# Patient Record
Sex: FEMALE | Race: WHITE | NOT HISPANIC OR LATINO | Employment: OTHER | URBAN - METROPOLITAN AREA
[De-identification: names, ages, dates, MRNs, and addresses within clinical notes are randomized per-mention and may not be internally consistent; named-entity substitution may affect disease eponyms.]

---

## 2017-01-12 LAB
EXT ALBUMIN: 3.5
EXT ALKALINE PHOSPHATASE: 84
EXT ALT: 23
EXT AST: 17
EXT BASOPHIL%: 0.6
EXT BILIRUBIN TOTAL: 0.9
EXT BUN: 10
EXT CALCIUM: 9.4
EXT CHLORIDE: 105
EXT CO2: 26
EXT CREATININE: 0.53 MG/DL
EXT EOSINOPHIL%: 5.5
EXT GLUCOSE: 167
EXT HCV QUANT: NOT DETECTED
EXT HEMATOCRIT: 39.9
EXT HEMOGLOBIN: 13.5
EXT LYMPH%: 10.8
EXT MONOCYTES%: 7.9
EXT PLATELETS: 164
EXT POTASSIUM: 3.9
EXT PROTEIN TOTAL: 6
EXT SEGS%: 74.6
EXT SODIUM: 141 MMOL/L
EXT TACROLIMUS LVL: 5
EXT WBC: 6.6

## 2017-01-19 ENCOUNTER — TELEPHONE (OUTPATIENT)
Dept: TRANSPLANT | Facility: CLINIC | Age: 62
End: 2017-01-19

## 2017-02-15 ENCOUNTER — TELEPHONE (OUTPATIENT)
Dept: TRANSPLANT | Facility: CLINIC | Age: 62
End: 2017-02-15

## 2017-02-15 NOTE — TELEPHONE ENCOUNTER
Pt stated that her AV fistula is clotted and needs a referral to see doctor here. Will discuss with Dr. Contreras.

## 2017-02-15 NOTE — TELEPHONE ENCOUNTER
----- Message from Debbie Mcnulty sent at 2/15/2017 11:26 AM CST -----  Contact: Eliezer  Calling to discuss pt appt, please call

## 2017-02-16 ENCOUNTER — TELEPHONE (OUTPATIENT)
Dept: TRANSPLANT | Facility: CLINIC | Age: 62
End: 2017-02-16

## 2017-02-16 NOTE — TELEPHONE ENCOUNTER
Spoke with Dr. Contreras regarding pt's clotted fistula. Dr. Contreras does not feel like pt needs to have fistula declotted since she is not getting dialysis anymore. Called pt to discuss this, and pt stated that her nephrologist was concerned, and wanted her to be seen by a vascular surgeon here at [facility]. Advised pt to call her nephrologist and have them give her a referral to vascular surgery here at [facility]. Pt verbalized understanding.

## 2017-02-17 ENCOUNTER — TELEPHONE (OUTPATIENT)
Dept: TRANSPLANT | Facility: CLINIC | Age: 62
End: 2017-02-17

## 2017-02-17 NOTE — TELEPHONE ENCOUNTER
----- Message from Debbie Mcnulty sent at 2/17/2017 11:40 AM CST -----  Contact: Eliezer  Calling to discuss pt, please call

## 2017-02-20 ENCOUNTER — TELEPHONE (OUTPATIENT)
Dept: VASCULAR SURGERY | Facility: CLINIC | Age: 62
End: 2017-02-20

## 2017-02-20 ENCOUNTER — TELEPHONE (OUTPATIENT)
Dept: TRANSPLANT | Facility: CLINIC | Age: 62
End: 2017-02-20

## 2017-02-20 NOTE — TELEPHONE ENCOUNTER
Spoke to the pt, I was able to get her scheduled for 2/23 with a [facility] to eval old HD acces to be tied off if possible .

## 2017-02-20 NOTE — TELEPHONE ENCOUNTER
Received a call from patient's local MD's office; requesting that patient has dilated AV fistula and wants to have it evaluated at [facility] by vascular surgeon.  Coming to an appt. With liver NP on 2/23/17; will try to schedule appt. Same day.  Appt. Card given to .

## 2017-02-20 NOTE — TELEPHONE ENCOUNTER
----- Message from Carine Bales sent at 2/20/2017  3:43 PM CST -----  Contact: carine/ transplant [id number]  Carine Called and wanted to schedule a appointment with   And also had general questions. Carine said that Dr. Yip wants the patient to come in on 02/23/2017 Please call Елена @ [phone]  . Thanks :)

## 2017-02-23 ENCOUNTER — OFFICE VISIT (OUTPATIENT)
Dept: TRANSPLANT | Facility: CLINIC | Age: 62
End: 2017-02-23
Payer: MEDICARE

## 2017-02-23 ENCOUNTER — INITIAL CONSULT (OUTPATIENT)
Dept: VASCULAR SURGERY | Facility: CLINIC | Age: 62
End: 2017-02-23
Payer: MEDICARE

## 2017-02-23 ENCOUNTER — HOSPITAL ENCOUNTER (OUTPATIENT)
Dept: VASCULAR SURGERY | Facility: CLINIC | Age: 62
Discharge: HOME | End: 2017-02-23
Attending: SURGERY
Payer: MEDICARE

## 2017-02-23 VITALS
HEIGHT: 63 IN | TEMPERATURE: 98 F | BODY MASS INDEX: 28.13 KG/M2 | HEART RATE: 87 BPM | SYSTOLIC BLOOD PRESSURE: 185 MMHG | DIASTOLIC BLOOD PRESSURE: 107 MMHG | OXYGEN SATURATION: 98 % | RESPIRATION RATE: 16 BRPM | WEIGHT: 158.75 LBS

## 2017-02-23 VITALS
BODY MASS INDEX: 28.23 KG/M2 | SYSTOLIC BLOOD PRESSURE: 177 MMHG | TEMPERATURE: 99 F | HEIGHT: 63 IN | DIASTOLIC BLOOD PRESSURE: 87 MMHG | WEIGHT: 159.31 LBS | HEART RATE: 91 BPM

## 2017-02-23 DIAGNOSIS — Z29.89 PROPHYLACTIC IMMUNOTHERAPY: ICD-10-CM

## 2017-02-23 DIAGNOSIS — Z86.19 HISTORY OF HEPATITIS C: ICD-10-CM

## 2017-02-23 DIAGNOSIS — T82.898A ANEURYSM OF ARTERIOVENOUS DIALYSIS FISTULA, INITIAL ENCOUNTER: Primary | ICD-10-CM

## 2017-02-23 DIAGNOSIS — N18.6 ESRD (END STAGE RENAL DISEASE): ICD-10-CM

## 2017-02-23 DIAGNOSIS — Z01.818 PRE-OP EVALUATION: Primary | ICD-10-CM

## 2017-02-23 DIAGNOSIS — Z94.0 KIDNEY TRANSPLANT STATUS, LIVING RELATED DONOR: Chronic | ICD-10-CM

## 2017-02-23 DIAGNOSIS — Z94.4 LIVER REPLACED BY TRANSPLANT: Primary | ICD-10-CM

## 2017-02-23 DIAGNOSIS — N18.6 ESRD (END STAGE RENAL DISEASE): Primary | ICD-10-CM

## 2017-02-23 PROCEDURE — 99999 PR PBB SHADOW E&M-EST. PATIENT-LVL IV: CPT | Mod: PBBFAC,,, | Performed by: SURGERY

## 2017-02-23 PROCEDURE — 99999 PR PBB SHADOW E&M-EST. PATIENT-LVL IV: CPT | Mod: PBBFAC,,, | Performed by: NURSE PRACTITIONER

## 2017-02-23 PROCEDURE — 99214 OFFICE O/P EST MOD 30 MIN: CPT | Mod: PBBFAC | Performed by: NURSE PRACTITIONER

## 2017-02-23 PROCEDURE — 99214 OFFICE O/P EST MOD 30 MIN: CPT | Mod: S$PBB,,, | Performed by: NURSE PRACTITIONER

## 2017-02-23 PROCEDURE — 99204 OFFICE O/P NEW MOD 45 MIN: CPT | Mod: S$PBB,,, | Performed by: SURGERY

## 2017-02-23 PROCEDURE — 93990 DOPPLER FLOW TESTING: CPT | Mod: 26,S$PBB,, | Performed by: SURGERY

## 2017-02-23 RX ORDER — SODIUM CHLORIDE 9 MG/ML
INJECTION, SOLUTION INTRAVENOUS CONTINUOUS
Status: CANCELLED | OUTPATIENT
Start: 2017-02-23

## 2017-02-23 RX ORDER — LIDOCAINE HYDROCHLORIDE 10 MG/ML
1 INJECTION, SOLUTION EPIDURAL; INFILTRATION; INTRACAUDAL; PERINEURAL ONCE
Status: CANCELLED | OUTPATIENT
Start: 2017-02-23 | End: 2017-02-23

## 2017-02-23 RX ORDER — PNV NO.95/FERROUS FUM/FOLIC AC 28MG-0.8MG
100 TABLET ORAL
COMMUNITY

## 2017-02-23 NOTE — PROGRESS NOTES
Елена Atkinson  02/23/2017    HPI:  Patient is a 61 y.o. female with liver and kidney transplant, HTN, DM who is here today for evaluation of HD access.  Ms. Atkinson is a kidney and liver transplant recipient.  She received her living donor kidney transplant in 2009.  Her kidney has been working well. There are no issues. Prior to her kidney transplant, she was on dialysis using a Right Upper arm dialysis access.  She reports that in the last year, the dialysis access site has gotten larger.  It is not painful. She denies arm numbness or tingling. No chest pain. No SOB.     No MI/stroke  Tobacco use: Never smoker    Past Medical History   Diagnosis Date    CKD (chronic kidney disease) 8/26/2013    ESRD (end stage renal disease) 12/18/07     IgA Nephropathy    History of HCV, s/p successful Rx w/ SVR24 - 5/2016      Liver biopsy 2/6/12: Stage 1 fibrosis Completed 24 weeks Harvoni + RBV w/ SVR    Hypertension, renal     IgA nephropathy     Immunosuppressive management encounter following kidney transplant     Kidney transplant status, living related donor 8/19/2009    Obesity     Type 2 diabetes mellitus      Past Surgical History   Procedure Laterality Date    Liver transplant      Kidney transplant      Cardiac surgery      Tubal ligation       Family History   Problem Relation Age of Onset    Cancer Mother 52     lung cancer    Hypertension Mother     Kidney disease Neg Hx      Social History     Social History    Marital status:      Spouse name: N/A    Number of children: 2    Years of education: N/A     Occupational History    Not on file.     Social History Main Topics    Smoking status: Never Smoker    Smokeless tobacco: Never Used    Alcohol use No    Drug use: No    Sexual activity: No     Other Topics Concern    Not on file     Social History Narrative    Елена takes online courses and is a senior working on a degree for [employer].     Current Outpatient  Prescriptions on File Prior to Visit   Medication Sig    hydrALAZINE (APRESOLINE) 25 MG tablet Take 25 mg by mouth 3 (three) times daily.    insulin lispro (HUMALOG KWIKPEN) 100 unit/mL InPn Inject 5 Units as directed Three times a day before meals. As needed    LANTUS SOLOSTAR 100 unit/mL (3 mL) InPn pen INJECT 12 UNITS EACH MORNING. (Patient taking differently: INJECT 16 UNITS EACH MORNING.)    levothyroxine (SYNTHROID) 50 MCG tablet Take 50 mcg by mouth once daily.    magnesium oxide (MAG-OX) 400 mg tablet Take 2 tablets (800 mg total) by mouth 3 (three) times daily. (Patient taking differently: Take 250 mg by mouth once daily. )    multivitamin (MULTIPLE VITAMIN) per tablet Take 1 tablet by mouth Daily.    mycophenolate (CELLCEPT) 250 mg Cap TAKE 3 CAPSULES TWICE DAILY    predniSONE (DELTASONE) 5 MG tablet Take 1 tablet by mouth Daily.    tacrolimus (PROGRAF) 0.5 MG Cap Take 3 capsules (1.5 mg total) by mouth once daily. (Patient taking differently: Take 1 mg by mouth once daily. )    tacrolimus (PROGRAF) 0.5 MG Cap TAKE 3 CAPSULES ONCE A DAY.    vitamin D 1000 units Tab Take 1,000 Units by mouth once daily.      No current facility-administered medications on file prior to visit.        REVIEW OF SYSTEMS:  General: negative; ENT: negative; Allergy and Immunology: negative; Hematological and Lymphatic: Negative; Endocrine: negative; Respiratory: no cough, shortness of breath, or wheezing; Cardiovascular: no chest pain or dyspnea on exertion; Gastrointestinal: no abdominal pain/back, change in bowel habits, or bloody stools; Genito-Urinary: no dysuria, trouble voiding, or hematuria; Musculoskeletal: negative  Neurological: no TIA or stroke symptoms    PHYSICAL EXAM:      Pulse: 91  Temp: 98.5 °F (36.9 °C)      General appearance:  Alert, well-appearing, and in no distress.  Oriented to person, place, and time   Neurological: Normal speech, no focal findings noted; CN II - XII grossly intact            Musculoskeletal: Digits/nail without cyanosis/clubbing.  Normal muscle strength/tone.                 Neck: Supple, no significant adenopathy; thyroid is not enlarged                   No carotid bruit can be auscultated                Chest:  Clear to auscultation, no wheezes, rales or rhonchi, symmetric air entry     No use of accessory muscles             Cardiac: Normal rate and regular rhythm, S1 and S2 normal; PMI non-displaced          Abdomen: Soft, nontender, nondistended, no masses or organomegaly     No rebound tenderness noted; bowel sounds normal     Pulsatile aortic mass is not  palpable.     No groin adenopathy      Extremities:  2+ pedal pulses palpable.     No pedal edema     No ulcerations    LAB RESULTS:  Lab Results   Component Value Date    K 3.9 10/23/2015    K 3.9 10/08/2015    K 3.9 09/25/2015    CREATININE 0.8 10/23/2015    CREATININE 0.9 10/08/2015    CREATININE 0.7 09/25/2015     Lab Results   Component Value Date    WBC 4.2 (A) 10/23/2015    WBC 4.1 (A) 10/08/2015    WBC 4.6 09/25/2015    HCT 33 (A) 10/23/2015    HCT 36 10/08/2015    HCT 35 (A) 09/25/2015     (L) 03/09/2015     (L) 10/15/2012    PLT 89 (L) 09/05/2012     Lab Results   Component Value Date    HGBA1C 6.8 (H) 12/07/2010     IMAGING:    IMP/PLAN:  61 y.o. female with a history of  with liver and kidney transplant, HTN, DM who is here today for evaluation to ligate RUE HD access.   This is likely a R BC AVF - old - patent but aneurysmal    AV access ligation and revision scheduled 3/2/17  HD access duplex today on way out of clinic    Saroj Rockwell MD FACS  Vascular/Endovascular Surgery

## 2017-02-23 NOTE — LETTER
February 23, 2017      Gianni Alonso MD  [address]  [place] [address]           [facility] - Vascular Surgery  [address]  [place] [address]  Phone: [phone]  Fax: [phone]          Patient: Елена Atkinson   MR Number: [id number]   YOB: 1955   Date of Visit: 2/23/2017       Dear Dr. Gianni Alonso:    Thank you for referring Елена Atkinson to me for evaluation. Attached you will find relevant portions of my assessment and plan of care.    If you have questions, please do not hesitate to call me. I look forward to following Елена Atkinson along with you.    Sincerely,    Saroj Rockwell MD    Enclosure  CC:  No Recipients    If you would like to receive this communication electronically, please contact [email or web address] or [phone] to request more information on [employer] access.    For providers and/or their staff who would like to refer a patient to [facility], please contact us through our one-stop-shop provider referral line, [facility], at [phone].    If you feel you have received this communication in error or would no longer like to receive these types of communications, please e-mail [email or web address]

## 2017-02-23 NOTE — LETTER
February 23, 2017        Grayson Hawthorne  [address]  [facility] [place]  [place] [address]  Phone: [phone]  Fax: [phone]     Gianni Alonso  [address]   [place] [address]  Phone: [phone]  Fax: [phone]             [facility]  [address]  [facility] [address]  Phone: [phone]   Patient: Елена Atkinson   MR Number: [id number]   YOB: 1955   Date of Visit: 2/23/2017       Dear Dr. Grayson Hawthorne, Gianni Alonso    Thank you for referring Елена Atkinson to me for evaluation. Attached you will find relevant portions of my assessment and plan of care.    If you have questions, please do not hesitate to call me. I look forward to following Елена Atkinson along with you.    Sincerely,    Ivett Weinstein NP    Enclosure    If you would like to receive this communication electronically, please contact [email or web address] or [phone] to request [employer] Link access.    [employer] Link is a tool which provides read-only access to select patient information with whom you have a relationship. Its easy to use and provides real time access to review your patients record including encounter summaries, notes, results, and demographic information.    If you feel you have received this communication in error or would no longer like to receive these types of communications, please e-mail [email or web address]

## 2017-02-23 NOTE — MR AVS SNAPSHOT
[facility]  [address]  [place] [address]  Phone: [phone]                  Елена Atkinson   2017 3:00 PM   Office Visit    Description:  Female : 1955   Provider:  Ivett Weinstein NP   Department:  [facility] - Liver Transplant                To Do List           Future Appointments        Provider Department Dept Phone    2017 4:30 PM LAB, SAME DAY [facility] [phone]      Your Future Surgeries/Procedures     Mar 02, 2017   Surgery with Saroj Rockwell MD   [facility] ([facility])    [address] [facility] [address] [phone]              Goals (5 Years of Data)     None      [facility]     [facility] On Call Nurse Care Line -  Assistance  Registered nurses in the [facility] On Call Center provide clinical advisement, health education, appointment booking, and other advisory services.  Call for this free service at [phone].             Medications           Message regarding Medications     Verify the changes and/or additions to your medication regime listed below are the same as discussed with your clinician today.  If any of these changes or additions are incorrect, please notify your healthcare provider.             Verify that the below list of medications is an accurate representation of the medications you are currently taking.  If none reported, the list may be blank. If incorrect, please contact your healthcare provider. Carry this list with you in case of emergency.           Current Medications     cyanocobalamin (VITAMIN B-12) 100 MCG tablet Take 100 mcg by mouth once daily.    hydrALAZINE (APRESOLINE) 25 MG tablet Take 25 mg by mouth 3 (three) times daily.    insulin lispro (HUMALOG KWIKPEN) 100 unit/mL InPn Inject 5 Units as directed Three times a day before meals. As needed    LANTUS SOLOSTAR 100 unit/mL (3 mL) InPn pen INJECT 12 UNITS EACH MORNING.  "   levothyroxine (SYNTHROID) 50 MCG tablet Take 50 mcg by mouth once daily.    magnesium oxide (MAG-OX) 400 mg tablet Take 2 tablets (800 mg total) by mouth 3 (three) times daily.    multivitamin (MULTIPLE VITAMIN) per tablet Take 1 tablet by mouth Daily.    mycophenolate (CELLCEPT) 250 mg Cap TAKE 3 CAPSULES TWICE DAILY    predniSONE (DELTASONE) 5 MG tablet Take 1 tablet by mouth Daily.    tacrolimus (PROGRAF) 0.5 MG Cap Take 3 capsules (1.5 mg total) by mouth once daily.    vitamin D 1000 units Tab Take 1,000 Units by mouth once daily.     tacrolimus (PROGRAF) 0.5 MG Cap TAKE 3 CAPSULES ONCE A DAY.           Clinical Reference Information           Your Vitals Were     BP Pulse Temp Resp Height Weight    185/107 (BP Location: Left arm, Patient Position: Sitting, BP Method: Automatic) 87 98 °F (36.7 °C) (Oral) 16 5' 3" (1.6 m) 72 kg (158 lb 11.7 oz)    SpO2 BMI             98% 28.12 kg/m2         Blood Pressure          Most Recent Value    BP  (!)  185/107      Allergies as of 2/23/2017     No Known Drug Allergies      Immunizations Administered on Date of Encounter - 2/23/2017     None      Maintenance Dialysis History     Patient has no recorded history of maintenance dialysis.      Transplant Information        Txp Date Organ Coordinator Care Team    7/26/2003 Liver Ebonie Bell RN Current Hepatologist:  Jeremiah Contreras MD   Current Nephrologist:  Grayson Hawthorne DO   Corresponding Physician:  Gianni Alonso MD   Current PCP:  Gianni Alonso MD   Corresponding Physician:  Grayson Hawthorne DO         Language Assistance Services     ATTENTION: Language assistance services are available, free of charge. Please call [phone].      ATENCIÓN: Si habla español, tiene a loyola disposición servicios gratuitos de asistencia lingüística. Llame al [phone].     CLYDE Ý: N?u b?n nói Ti?ng Vi?t, có các d?ch v? h? tr? ngôn ng? mi?n phí dành cho b?n. G?i s? [phone].         [facility] " Transplant complies with applicable Federal civil rights laws and does not discriminate on the basis of race, color, national origin, age, disability, or sex.

## 2017-02-23 NOTE — PROGRESS NOTES
Transplant Hepatology  Liver Transplant Recipient Follow-up    Transplant Date: 2003  UNOS Native Liver Dx: Diabetes Mellitus - Type II    Елена is here for follow up of her liver transplant.    ORGAN:   Whole or Partial:   Donor Type:   CDC High Risk:   Donor CMV Status:   Donor HCV Status:   Donor HBcAb:   Biliary Anastomosis:   Arterial Anatomy:   IVC reconstruction:   Portal vein status:     She has had the following complications since transplant: recurrent HCV s/p treatment w/ cure. The noted complications is well controlled.    Subjective:     Interval History: Елена was last seen on . Currently, she is doing well. Current complaints include none.    She received a  donor liver transplant for HCV cirrhosis at [facility] in 2003.  She subsequently developed ESRD from IgA nephropathy which required initiation of dialysis in 2007 and she ended up having a living kidney transplant on 09.     Her last liver biopsy was in 2012 but she had an elastography test in 2015.  2015 elastography showed F2-3    Received Harvoni/riba x 24 weeks- completed on 10/2015 w/ SVR 24  She is maintained on tacro + MMF, with good allograft function  No c/o jaundice, dark urine, abdominal pain or distension, edema    Her BP is elevated today, she did not take her hydralazine    Last liver biopsy: 2012  NO EVIDENCE OF REJECTION.  CHRONIC HEPATITIS C WITH MILD ACTIVITY  AND RARE APOPTOTIC BODIES (GRADE 1-2 OUT OF 4).  PORTAL FIBROSIS (STAGE 1 OUT OF 4)  NO EVIDENCE OF CIRRHOSIS.  STEATOSIS, LESS THAN 1%.  NO IRON.  IRON AND TRICHROME WITH APPROPRIATE CONTROLS  Review of Systems   Constitutional: Negative for activity change, appetite change, chills, diaphoresis, fatigue, fever and unexpected weight change.   HENT: Negative for facial swelling and nosebleeds.    Respiratory: Negative for cough, chest tightness and shortness of breath.    Cardiovascular: Negative for chest pain,  palpitations and leg swelling.   Gastrointestinal: Negative for abdominal distention, abdominal pain, blood in stool, constipation, diarrhea, nausea and vomiting.   Musculoskeletal: Negative for neck pain and neck stiffness.   Skin: Negative for color change, pallor and rash.   Neurological: Negative for dizziness, tremors, syncope, weakness, light-headedness and headaches.   Hematological: Negative for adenopathy. Does not bruise/bleed easily.   Psychiatric/Behavioral: Negative for agitation, behavioral problems, confusion and decreased concentration.       Objective:     Physical Exam   Constitutional: She is oriented to person, place, and time. She appears well-developed and well-nourished. No distress.   HENT:   Head: Normocephalic and atraumatic.   Eyes: Conjunctivae are normal. Pupils are equal, round, and reactive to light. No scleral icterus.   Neck: Normal range of motion. Neck supple.   Cardiovascular: Normal rate.    Pulmonary/Chest: Effort normal.   Abdominal: Soft. She exhibits no distension and no mass. There is no tenderness. There is no rebound and no guarding.   Musculoskeletal: Normal range of motion.   Neurological: She is alert and oriented to person, place, and time. No cranial nerve deficit. She exhibits normal muscle tone. Coordination normal.   No asterixis   Skin: Skin is warm and dry. No rash noted. She is not diaphoretic. No erythema. No pallor.        Surgical incision healed well, no hernia appreciated   Psychiatric: She has a normal mood and affect. Her behavior is normal. Judgment and thought content normal.     Lab Results   Component Value Date    BILITOT 0.7 03/09/2015    AST 23 10/23/2015     (H) 03/09/2015    ALT 26 10/23/2015     (H) 03/09/2015    ALKPHOS 127 03/09/2015    CREATININE 0.8 10/23/2015    CREATININE 0.7 03/09/2015    ALBUMIN 3.0 (A) 10/23/2015    ALBUMIN 1.5 (L) 03/09/2015     Lab Results   Component Value Date    WBC 4.2 (A) 10/23/2015    WBC 4.92  03/09/2015    HGB 10.5 (A) 10/23/2015    HCT 33 (A) 10/23/2015     (L) 03/09/2015     Lab Results   Component Value Date    TACROLIMUS 7.4 10/08/2015    TACROLIMUS 3.3 (L) 03/09/2015       Assessment/Plan:     1. Liver replaced by transplant for HCV 7/2003    2. Prophylactic immunosuppression for kidney transplant    3. Kidney transplant status, living related donor 8/19/2009    4. History of HCV, s/p successful Rx w/ SVR24 - 5/2016        S/P liver transplant due to HCV : Normal LFTs. Continue monitoring symptoms, labs and drug levels for drug-related toxicity and side effects  -continue with post transplant labs per protocol  IS: Chronic immunosuppressive medications for rejection prophylaxis at target.   no adjustment needed.   Maintenance:  -Instructed to f/u with PCP for regular health maintenance care including cancer screenings and BMD  -Reviewed need to avoid sun exposure with use of sunblock, hats, long sleeves related to increased risk of skin cancers  -Recommend f/u with Dermatology for annual skin checks    RTC 1 year with staff Hepatology    DIANA Biggs Patient Status  Functional Status: 100% - Normal, no complaints, no evidence of disease  Physical Capacity: No Limitations

## 2017-03-01 ENCOUNTER — TELEPHONE (OUTPATIENT)
Dept: VASCULAR SURGERY | Facility: CLINIC | Age: 62
End: 2017-03-01

## 2017-03-01 RX ORDER — ALENDRONATE SODIUM 70 MG/1
70 TABLET ORAL
COMMUNITY
End: 2022-04-04

## 2017-03-01 RX ORDER — TALC
250 POWDER (GRAM) TOPICAL
COMMUNITY
End: 2023-01-03

## 2017-03-01 RX ORDER — ALLOPURINOL 300 MG/1
300 TABLET ORAL EVERY MORNING
COMMUNITY
End: 2023-01-03 | Stop reason: SDUPTHER

## 2017-03-01 RX ORDER — LEVOTHYROXINE SODIUM 75 UG/1
75 TABLET ORAL
COMMUNITY
End: 2023-01-03 | Stop reason: SDUPTHER

## 2017-03-02 ENCOUNTER — SURGERY (OUTPATIENT)
Age: 62
End: 2017-03-02

## 2017-03-02 ENCOUNTER — ANESTHESIA (OUTPATIENT)
Dept: SURGERY | Facility: HOSPITAL | Age: 62
End: 2017-03-02
Payer: MEDICARE

## 2017-03-02 ENCOUNTER — ANESTHESIA EVENT (OUTPATIENT)
Dept: SURGERY | Facility: HOSPITAL | Age: 62
End: 2017-03-02
Payer: MEDICARE

## 2017-03-02 ENCOUNTER — HOSPITAL ENCOUNTER (OUTPATIENT)
Facility: HOSPITAL | Age: 62
Discharge: HOME | End: 2017-03-02
Attending: SURGERY | Admitting: SURGERY
Payer: MEDICARE

## 2017-03-02 VITALS
WEIGHT: 147 LBS | DIASTOLIC BLOOD PRESSURE: 70 MMHG | OXYGEN SATURATION: 97 % | HEART RATE: 82 BPM | TEMPERATURE: 98 F | SYSTOLIC BLOOD PRESSURE: 150 MMHG | HEIGHT: 63 IN | RESPIRATION RATE: 16 BRPM | BODY MASS INDEX: 26.05 KG/M2

## 2017-03-02 DIAGNOSIS — T82.898A ANEURYSM OF ARTERIOVENOUS DIALYSIS FISTULA, INITIAL ENCOUNTER: Primary | ICD-10-CM

## 2017-03-02 LAB
ANION GAP SERPL CALC-SCNC: 9 MMOL/L
BASOPHILS # BLD AUTO: 0.02 K/UL
BASOPHILS NFR BLD: 0.3 %
BUN SERPL-MCNC: 16 MG/DL
CALCIUM SERPL-MCNC: 9.1 MG/DL
CHLORIDE SERPL-SCNC: 107 MMOL/L
CO2 SERPL-SCNC: 24 MMOL/L
CREAT SERPL-MCNC: 0.7 MG/DL
DIFFERENTIAL METHOD: ABNORMAL
EOSINOPHIL # BLD AUTO: 0.3 K/UL
EOSINOPHIL NFR BLD: 4.7 %
ERYTHROCYTE [DISTWIDTH] IN BLOOD BY AUTOMATED COUNT: 12.9 %
EST. GFR  (AFRICAN AMERICAN): >60 ML/MIN/1.73 M^2
EST. GFR  (NON AFRICAN AMERICAN): >60 ML/MIN/1.73 M^2
GLUCOSE SERPL-MCNC: 181 MG/DL
HCT VFR BLD AUTO: 40.1 %
HGB BLD-MCNC: 13.6 G/DL
LYMPHOCYTES # BLD AUTO: 0.9 K/UL
LYMPHOCYTES NFR BLD: 15.2 %
MCH RBC QN AUTO: 32.3 PG
MCHC RBC AUTO-ENTMCNC: 33.9 %
MCV RBC AUTO: 95 FL
MONOCYTES # BLD AUTO: 0.4 K/UL
MONOCYTES NFR BLD: 6.3 %
NEUTROPHILS # BLD AUTO: 4.5 K/UL
NEUTROPHILS NFR BLD: 73 %
PLATELET # BLD AUTO: 147 K/UL
PMV BLD AUTO: 9.8 FL
POCT GLUCOSE: 164 MG/DL (ref 70–110)
POCT GLUCOSE: 203 MG/DL (ref 70–110)
POTASSIUM SERPL-SCNC: 3.7 MMOL/L
RBC # BLD AUTO: 4.21 M/UL
SODIUM SERPL-SCNC: 140 MMOL/L
WBC # BLD AUTO: 6.19 K/UL

## 2017-03-02 PROCEDURE — 25000003 PHARM REV CODE 250: Performed by: NURSE PRACTITIONER

## 2017-03-02 PROCEDURE — 63600175 PHARM REV CODE 636 W HCPCS: Performed by: NURSE ANESTHETIST, CERTIFIED REGISTERED

## 2017-03-02 PROCEDURE — 71000045 HC DOSC ROUTINE RECOVERY EA ADD'L HR: Performed by: SURGERY

## 2017-03-02 PROCEDURE — 88304 TISSUE EXAM BY PATHOLOGIST: CPT | Mod: 26,,, | Performed by: PATHOLOGY

## 2017-03-02 PROCEDURE — D9220A PRA ANESTHESIA: Mod: CRNA,,, | Performed by: NURSE ANESTHETIST, CERTIFIED REGISTERED

## 2017-03-02 PROCEDURE — 25000003 PHARM REV CODE 250: Performed by: SURGERY

## 2017-03-02 PROCEDURE — 80048 BASIC METABOLIC PNL TOTAL CA: CPT

## 2017-03-02 PROCEDURE — 25000003 PHARM REV CODE 250: Performed by: NURSE ANESTHETIST, CERTIFIED REGISTERED

## 2017-03-02 PROCEDURE — 88304 TISSUE EXAM BY PATHOLOGIST: CPT | Performed by: PATHOLOGY

## 2017-03-02 PROCEDURE — 85025 COMPLETE CBC W/AUTO DIFF WBC: CPT

## 2017-03-02 PROCEDURE — 27201423 OPTIME MED/SURG SUP & DEVICES STERILE SUPPLY: Performed by: SURGERY

## 2017-03-02 PROCEDURE — 71000044 HC DOSC ROUTINE RECOVERY FIRST HOUR: Performed by: SURGERY

## 2017-03-02 PROCEDURE — 36832 AV FISTULA REVISION OPEN: CPT | Mod: GC,,, | Performed by: SURGERY

## 2017-03-02 PROCEDURE — 27200671 HC STIMUCATH NEEDLE/ CATHETER: Performed by: ANESTHESIOLOGY

## 2017-03-02 PROCEDURE — 37000008 HC ANESTHESIA 1ST 15 MINUTES: Performed by: SURGERY

## 2017-03-02 PROCEDURE — 82962 GLUCOSE BLOOD TEST: CPT | Performed by: SURGERY

## 2017-03-02 PROCEDURE — 36000706: Performed by: SURGERY

## 2017-03-02 PROCEDURE — 71000015 HC POSTOP RECOV 1ST HR: Performed by: SURGERY

## 2017-03-02 PROCEDURE — 36000707: Performed by: SURGERY

## 2017-03-02 PROCEDURE — 37000009 HC ANESTHESIA EA ADD 15 MINS: Performed by: SURGERY

## 2017-03-02 PROCEDURE — D9220A PRA ANESTHESIA: Mod: ANES,,, | Performed by: ANESTHESIOLOGY

## 2017-03-02 RX ORDER — SODIUM CHLORIDE 9 MG/ML
INJECTION, SOLUTION INTRAVENOUS CONTINUOUS
Status: DISCONTINUED | OUTPATIENT
Start: 2017-03-02 | End: 2017-03-02 | Stop reason: HOSPADM

## 2017-03-02 RX ORDER — FENTANYL CITRATE 50 UG/ML
INJECTION, SOLUTION INTRAMUSCULAR; INTRAVENOUS
Status: DISCONTINUED | OUTPATIENT
Start: 2017-03-02 | End: 2017-03-02

## 2017-03-02 RX ORDER — GLUCAGON 1 MG
1 KIT INJECTION
Status: DISCONTINUED | OUTPATIENT
Start: 2017-03-02 | End: 2017-03-02 | Stop reason: HOSPADM

## 2017-03-02 RX ORDER — IBUPROFEN 200 MG
24 TABLET ORAL
Status: DISCONTINUED | OUTPATIENT
Start: 2017-03-02 | End: 2017-03-02 | Stop reason: HOSPADM

## 2017-03-02 RX ORDER — ONDANSETRON 2 MG/ML
INJECTION INTRAMUSCULAR; INTRAVENOUS
Status: DISCONTINUED | OUTPATIENT
Start: 2017-03-02 | End: 2017-03-02

## 2017-03-02 RX ORDER — MIDAZOLAM HYDROCHLORIDE 1 MG/ML
INJECTION, SOLUTION INTRAMUSCULAR; INTRAVENOUS
Status: DISCONTINUED | OUTPATIENT
Start: 2017-03-02 | End: 2017-03-02

## 2017-03-02 RX ORDER — LIDOCAINE HYDROCHLORIDE 10 MG/ML
1 INJECTION, SOLUTION EPIDURAL; INFILTRATION; INTRACAUDAL; PERINEURAL ONCE
Status: COMPLETED | OUTPATIENT
Start: 2017-03-02 | End: 2017-03-02

## 2017-03-02 RX ORDER — BACITRACIN 50000 [IU]/1
INJECTION, POWDER, FOR SOLUTION INTRAMUSCULAR
Status: DISCONTINUED | OUTPATIENT
Start: 2017-03-02 | End: 2017-03-02 | Stop reason: HOSPADM

## 2017-03-02 RX ORDER — HYDROCODONE BITARTRATE AND ACETAMINOPHEN 5; 325 MG/1; MG/1
1 TABLET ORAL EVERY 4 HOURS PRN
Qty: 31 TABLET | Refills: 0 | Status: SHIPPED | OUTPATIENT
Start: 2017-03-02 | End: 2022-01-03

## 2017-03-02 RX ORDER — ONDANSETRON HYDROCHLORIDE 4 MG/5ML
4 SOLUTION ORAL EVERY 6 HOURS PRN
Status: DISCONTINUED | OUTPATIENT
Start: 2017-03-02 | End: 2017-03-02 | Stop reason: HOSPADM

## 2017-03-02 RX ORDER — SODIUM CHLORIDE 9 MG/ML
INJECTION, SOLUTION INTRAVENOUS CONTINUOUS PRN
Status: DISCONTINUED | OUTPATIENT
Start: 2017-03-02 | End: 2017-03-02

## 2017-03-02 RX ORDER — HYDROCODONE BITARTRATE AND ACETAMINOPHEN 5; 325 MG/1; MG/1
1 TABLET ORAL EVERY 4 HOURS PRN
Status: DISCONTINUED | OUTPATIENT
Start: 2017-03-02 | End: 2017-03-02 | Stop reason: HOSPADM

## 2017-03-02 RX ORDER — PHENYLEPHRINE HCL IN 0.9% NACL 1 MG/10 ML
SYRINGE (ML) INTRAVENOUS
Status: DISCONTINUED | OUTPATIENT
Start: 2017-03-02 | End: 2017-03-02

## 2017-03-02 RX ORDER — IBUPROFEN 200 MG
16 TABLET ORAL
Status: DISCONTINUED | OUTPATIENT
Start: 2017-03-02 | End: 2017-03-02 | Stop reason: HOSPADM

## 2017-03-02 RX ORDER — PROPOFOL 10 MG/ML
VIAL (ML) INTRAVENOUS CONTINUOUS PRN
Status: DISCONTINUED | OUTPATIENT
Start: 2017-03-02 | End: 2017-03-02

## 2017-03-02 RX ORDER — HEPARIN SODIUM 1000 [USP'U]/ML
INJECTION, SOLUTION INTRAVENOUS; SUBCUTANEOUS
Status: DISCONTINUED | OUTPATIENT
Start: 2017-03-02 | End: 2017-03-02

## 2017-03-02 RX ORDER — HEPARIN SODIUM 1000 [USP'U]/ML
INJECTION, SOLUTION INTRAVENOUS; SUBCUTANEOUS
Status: DISCONTINUED | OUTPATIENT
Start: 2017-03-02 | End: 2017-03-02 | Stop reason: HOSPADM

## 2017-03-02 RX ORDER — PROTAMINE SULFATE 10 MG/ML
INJECTION, SOLUTION INTRAVENOUS
Status: DISCONTINUED | OUTPATIENT
Start: 2017-03-02 | End: 2017-03-02

## 2017-03-02 RX ORDER — INSULIN ASPART 100 [IU]/ML
0-5 INJECTION, SOLUTION INTRAVENOUS; SUBCUTANEOUS
Status: DISCONTINUED | OUTPATIENT
Start: 2017-03-02 | End: 2017-03-02 | Stop reason: HOSPADM

## 2017-03-02 RX ADMIN — FENTANYL CITRATE 25 MCG: 50 INJECTION, SOLUTION INTRAMUSCULAR; INTRAVENOUS at 11:03

## 2017-03-02 RX ADMIN — HEPARIN SODIUM 10000 UNITS: 1000 INJECTION, SOLUTION INTRAVENOUS; SUBCUTANEOUS at 10:03

## 2017-03-02 RX ADMIN — Medication 50 MCG: at 09:03

## 2017-03-02 RX ADMIN — PROTAMINE SULFATE 5 MG: 10 INJECTION, SOLUTION INTRAVENOUS at 11:03

## 2017-03-02 RX ADMIN — HEPARIN SODIUM 5000 UNITS: 1000 INJECTION, SOLUTION INTRAVENOUS; SUBCUTANEOUS at 10:03

## 2017-03-02 RX ADMIN — MIDAZOLAM HYDROCHLORIDE 2 MG: 1 INJECTION, SOLUTION INTRAMUSCULAR; INTRAVENOUS at 09:03

## 2017-03-02 RX ADMIN — LIDOCAINE HYDROCHLORIDE 0.2 MG: 10 INJECTION, SOLUTION EPIDURAL; INFILTRATION; INTRACAUDAL; PERINEURAL at 08:03

## 2017-03-02 RX ADMIN — Medication 50 MCG: at 10:03

## 2017-03-02 RX ADMIN — PROTAMINE SULFATE 25 MG: 10 INJECTION, SOLUTION INTRAVENOUS at 11:03

## 2017-03-02 RX ADMIN — PROPOFOL 150 MCG/KG/MIN: 10 INJECTION, EMULSION INTRAVENOUS at 09:03

## 2017-03-02 RX ADMIN — Medication 4 MG: at 12:03

## 2017-03-02 RX ADMIN — SODIUM CHLORIDE: 0.9 INJECTION, SOLUTION INTRAVENOUS at 08:03

## 2017-03-02 RX ADMIN — BACITRACIN 50000 UNITS: 50000 INJECTION, POWDER, LYOPHILIZED, FOR SOLUTION INTRAMUSCULAR at 11:03

## 2017-03-02 RX ADMIN — SODIUM CHLORIDE: 0.9 INJECTION, SOLUTION INTRAVENOUS at 09:03

## 2017-03-02 RX ADMIN — FENTANYL CITRATE 50 MCG: 50 INJECTION, SOLUTION INTRAMUSCULAR; INTRAVENOUS at 09:03

## 2017-03-02 RX ADMIN — ONDANSETRON 4 MG: 2 INJECTION INTRAMUSCULAR; INTRAVENOUS at 11:03

## 2017-03-02 RX ADMIN — Medication 2 G: at 09:03

## 2017-03-02 RX ADMIN — Medication 100 MCG: at 10:03

## 2017-03-02 RX ADMIN — Medication 100 MCG: at 09:03

## 2017-03-02 NOTE — PROGRESS NOTES
Notified Dr. Solomon of blood glucose 203. No new order given, He stated will cover after procedure.

## 2017-03-02 NOTE — ANESTHESIA POSTPROCEDURE EVALUATION
"Anesthesia Post Evaluation    Patient: Елена Atkinson    Procedure(s) Performed: Procedure(s) (LRB):  LIGATION/ EXCISION -FISTULA-AV  right brachial artery repair (Right)    Final Anesthesia Type: regional  Patient location during evaluation: PACU  Patient participation: Yes- Able to Participate  Level of consciousness: awake and alert  Post-procedure vital signs: reviewed and stable  Pain management: adequate  Airway patency: patent  PONV status at discharge: No PONV  Anesthetic complications: no      Cardiovascular status: blood pressure returned to baseline  Respiratory status: unassisted  Hydration status: euvolemic  Follow-up not needed.        Visit Vitals    BP (!) 150/70    Pulse 80    Temp 36.7 °C (98.1 °F) (Oral)    Resp 16    Ht 5' 3" (1.6 m)    Wt 66.7 kg (147 lb)    SpO2 96%    Breastfeeding No    BMI 26.04 kg/m2       Pain/Sari Score: Pain Assessment Performed: Yes (3/2/2017 11:57 AM)  Presence of Pain: denies (3/2/2017 11:57 AM)  Sari Score: 9 (3/2/2017 11:57 AM)      "

## 2017-03-02 NOTE — IP AVS SNAPSHOT
[facility]  [address]  [facility] [address]  Phone: [phone]           Patient Discharge Instructions     Our goal is to set you up for success. This packet includes information on your condition, medications, and your home care. It will help you to care for yourself so you don't get sicker and need to go back to the hospital.     Please ask your nurse if you have any questions.        There are many details to remember when preparing to leave the hospital. Here is what you will need to do:    1. Take your medicine. If you are prescribed medications, review your Medication List in the following pages. You may have new medications to  at the pharmacy and others that you'll need to stop taking. Review the instructions for how and when to take your medications. Talk with your doctor or nurses if you are unsure of what to do.     2. Go to your follow-up appointments. Specific follow-up information is listed in the following pages. Your may be contacted by a transition nurse or clinical provider about future appointments. Be sure we have all of the phone numbers to reach you, if needed. Please contact your provider's office if you are unable to make an appointment.     3. Watch for warning signs. Your doctor or nurse will give you detailed warning signs to watch for and when to call for assistance. These instructions may also include educational information about your condition. If you experience any of warning signs to your health, call your doctor.               [facility]  Unless otherwise directed by your provider, please contact [facility], our nurse care line that is available for 24/7 assistance.     [phone] (toll-free)    Registered nurses in the [facility] provide clinical advisement, health education, appointment booking, and other advisory services.                    ** Verify the list of medication(s) below is accurate and up  to date. Carry this with you in case of emergency. If your medications have changed, please notify your healthcare provider.             Medication List      START taking these medications        Additional Info                      docusate sodium 50 MG capsule   Commonly known as:  COLACE   Refills:  0   Dose:  50 mg    Instructions:  Take 1 capsule (50 mg total) by mouth 2 (two) times daily.     Begin Date    AM    Noon    PM    Bedtime       hydrocodone-acetaminophen 5-325mg 5-325 mg per tablet   Commonly known as:  NORCO   Quantity:  31 tablet   Refills:  0   Dose:  1 tablet    Instructions:  Take 1 tablet by mouth every 4 (four) hours as needed.     Begin Date    AM    Noon    PM    Bedtime         CHANGE how you take these medications        Additional Info                      LANTUS SOLOSTAR 100 unit/mL (3 mL) Inpn pen   Quantity:  15 mL   Refills:  11   What changed:  See the new instructions.   Generic drug:  insulin glargine    Instructions:  INJECT 12 UNITS EACH MORNING.     Begin Date    AM    Noon    PM    Bedtime       * tacrolimus 0.5 MG Cap   Commonly known as:  PROGRAF   Refills:  0   Dose:  1.5 mg   What changed:    - how much to take  - when to take this    Instructions:  Take 3 capsules (1.5 mg total) by mouth once daily.     Begin Date    AM    Noon    PM    Bedtime       * tacrolimus 0.5 MG Cap   Commonly known as:  PROGRAF   Quantity:  90 capsule   Refills:  11   What changed:  Another medication with the same name was changed. Make sure you understand how and when to take each.    Instructions:  TAKE 3 CAPSULES ONCE A DAY.     Begin Date    AM    Noon    PM    Bedtime       * Notice:  This list has 2 medication(s) that are the same as other medications prescribed for you. Read the directions carefully, and ask your doctor or other care provider to review them with you.      CONTINUE taking these medications        Additional Info                      alendronate 70 MG tablet   Commonly  known as:  FOSAMAX   Refills:  0   Dose:  70 mg    Instructions:  Take 70 mg by mouth every 7 days. Takes on Saturdays     Begin Date    AM    Noon    PM    Bedtime       allopurinol 300 MG tablet   Commonly known as:  ZYLOPRIM   Refills:  0   Dose:  300 mg    Instructions:  Take 300 mg by mouth every morning.     Begin Date    AM    Noon    PM    Bedtime       cyanocobalamin 100 MCG tablet   Commonly known as:  VITAMIN B-12   Refills:  0   Dose:  100 mcg    Instructions:  Take 100 mcg by mouth after lunch.     Begin Date    AM    Noon    PM    Bedtime       HUMALOG KWIKPEN 100 unit/mL Inpn pen   Refills:  0   Dose:  5 Units   Indications:  type 2 diabetes mellitus   Generic drug:  insulin lispro    Instructions:  Inject 5 Units as directed Three times a day before meals. And has a Sliding Scale     Begin Date    AM    Noon    PM    Bedtime       hydrALAZINE 25 MG tablet   Commonly known as:  APRESOLINE   Refills:  0   Dose:  25 mg    Instructions:  Take 25 mg by mouth 3 (three) times daily. Has been taking twice a day     Begin Date    AM    Noon    PM    Bedtime       levothyroxine 75 MCG tablet   Commonly known as:  SYNTHROID   Refills:  0   Dose:  75 mcg    Instructions:  Take 75 mcg by mouth before breakfast.     Begin Date    AM    Noon    PM    Bedtime       magnesium oxide 250 mg Tab   Commonly known as:  MAG-OX   Refills:  0   Dose:  250 mg    Instructions:  Take 250 mg by mouth after lunch.     Begin Date    AM    Noon    PM    Bedtime       MULTIPLE VITAMIN per tablet   Refills:  0   Dose:  1 tablet   Generic drug:  multivitamin    Instructions:  Take 1 tablet by mouth after lunch.     Begin Date    AM    Noon    PM    Bedtime       mycophenolate 250 mg Cap   Commonly known as:  CELLCEPT   Quantity:  180 capsule   Refills:  11    Instructions:  TAKE 3 CAPSULES TWICE DAILY     Begin Date    AM    Noon    PM    Bedtime       PREDNISONE ORAL   Refills:  0   Dose:  4 mg    Instructions:  Take 4 mg by mouth  every morning.     Begin Date    AM    Noon    PM    Bedtime       vitamin D 1000 units Tab   Refills:  0   Dose:  1000 Units    Instructions:  Take 1,000 Units by mouth after lunch.     Begin Date    AM    Noon    PM    Bedtime            Where to Get Your Medications      You can get these medications from any pharmacy     Bring a paper prescription for each of these medications     hydrocodone-acetaminophen 5-325mg 5-325 mg per tablet       You don't need a prescription for these medications     docusate sodium 50 MG capsule                  Please bring to all follow up appointments:    1. A copy of your discharge instructions.  2. All medicines you are currently taking in their original bottles.  3. Identification and insurance card.    Please arrive 15 minutes ahead of scheduled appointment time.    Please call 24 hours in advance if you must reschedule your appointment and/or time.        Follow-up Information     Follow up with Saroj Rockwell MD In 2 weeks.    Specialty:  Vascular Surgery    Contact information:    [address]  [facility] [address]  [phone]          Discharge Instructions     Future Orders    Call MD for:  difficulty breathing or increased cough     Call MD for:  increased confusion or weakness     Call MD for:  persistent dizziness, light-headedness, or visual disturbances     Call MD for:  redness, tenderness, or signs of infection (pain, swelling, redness, odor or green/yellow discharge around incision site)     Call MD for:  severe uncontrolled pain     Call MD for:  temperature >100.4     Call MD for:  worsening rash     Diet general     Questions:    Total calories:      Fat restriction, if any:      Protein restriction, if any:      Na restriction, if any:      Fluid restriction:      Additional restrictions:      Other restrictions (specify):     Comments:    No driving while still taking pain medications daily.   Take a stool softener while taking pain medications  daily.   No lifting more than 10 lbs for 6 weeks with right arm.        Discharge Instructions       Remove dressing in 48 hours, ok to shower and wash site, no tub bathing or submerging in water x 2 weeks.       Primary Diagnosis     Your primary diagnosis was:  Aneurysm      Admission Information     Date & Time Provider Department CSN    3/2/2017  7:02 AM Saroj Rockwell MD [facility] [id number]      Care Providers     Provider Role Specialty Primary office phone    Saroj Rockwell MD Attending Provider Vascular Surgery [phone]    Saroj Rockwell MD Surgeon  Vascular Surgery [phone]      Your Vitals Were     BP                   136/63 (BP Location: Left arm)           Recent Lab Values        12/7/2010                          11:18 AM           A1C 6.8 (H)                       Pending Labs     Order Current Status    Specimen to Pathology - Surgery Collected (03/02/17 1009)      Allergies as of 3/2/2017        Reactions    No Known Drug Allergies       Advance Directives     An advance directive is a document which, in the event you are no longer able to make decisions for yourself, tells your healthcare team what kind of treatment you do or do not want to receive, or who you would like to make those decisions for you.  If you do not currently have an advance directive, [facility] encourages you to create one.  For more information call:  [phone]), [phone] ([phone]),  or log on to [email or web address].        Language Assistance Services     ATTENTION: Language assistance services are available, free of charge. Please call [phone].      ATENCIÓN: Si habla español, tiene a loyola disposición servicios gratuitos de asistencia lingüística. Llame al [phone].     CHÚ Ý: N?u b?n nói Ti?ng Vi?t, có các d?ch v? h? tr? ngôn ng? mi?n phí dành cho b?n. G?i s? [phone].        Chronic Kindey Disease Education             Diabetes Discharge  Instructions [facility] complies with applicable Federal civil rights laws and does not discriminate on the basis of race, color, national origin, age, disability, or sex.

## 2017-03-02 NOTE — PRE-PROCEDURE INSTRUCTIONS
Spoke with Patient.  NPO, medication, and pre-op instructions reviewed.  Denies previous problems with Anesthesia.  Stated that her morning glucose readings are usually higher in the morning ~ 176.  Stated that she needs an ekg in DOSC.  Sent an IM message to  Tammy (Dr. Rockwell) about her saying that she needs an ekg pre-op. Verbalized understanding of instructions.

## 2017-03-02 NOTE — OP NOTE
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]; R BC AVF aneurysm - painful    POSTOPERATIVE DIAGNOSIS: Aneurysm of arteriovenous dialysis fistula, initial encounter [T82.898A]; R BC AVF aneurysm - painful    PROCEDURES PERFORMED:  1) Excision of right brachiocephalic AVF aneurysm  2) Repair of right brachial artery with basilic vein patch angioplasty    ATTENDING SURGEON: Dr Saroj Rockwell    RESIDENT: Dr Ana Talavera    ANESTHESIA: Regional/MAC    KEY FINDINGS: Successful excision and vein patch angioplasty  2+ R radial     ESTIMATED BLOOD LOSS: 25 ml    DRAINS: None    COMPLICATIONS: None    INDICATIONS FOR PROCEDURE: The patient is a 61 y.o. female with a history of a liver and kidney transplant and a remote history of a right brachiocephalic AV fistula. The fistula had developed an aneurysm and was painful. Based on this surgery was indicated.    PROCEDURE IN DETAIL: After informed consent was obtained, the patient was brought to the Operative Theater and placed in in the supine position. After adequate regional anesthesia the patient was prepped and draped in the usual sterile fashion. We did look with an ultrasound and identified the fistula and it's anastomosis. The venous outflow was thrombosed. We identified an adequate appearing basilic vein. A timeout procedure was performed and a transverse incision was made over the fistula proximal to the antecubital fossa was made, extending in an L shape medially. The incision was carried down through the subcutaneous tissue and the aneurysm was encountered. We began to dissect the aneurysm free. We did enter the aneurysm. We placed a clamped and ligated the distal aneurysm with a 3-0 Prolene and tied it off with a 0 Silk tie. We began to dissect around the brachial artery and obtained proximal and distal control. We did ligate the brachial vein that was running with the brachial  artery and had hoped to use this vein for our patch, but it was very thin walled and not suitable for a patch. Systemic heparin was administered by anesthesia. With control of the brachial artery, we noted that the base of the aneurysm was quite large and we were concerned that a primary repair would narrow the brachial artery. We identified the basilic vein and ligated and removed an adequate section which we trimmed with Leonardo scissors to use as an autogenous vein patch. We placed profunda clamps proximally and distally on the brachial artery and sharply removed the aneurysm from the brachial artery. We flushed the brachial artery. We then proceeded to place the basilic vein patch over the hole in the brachial artery with 6-0 Prolenes. We backbled the brachial artery prior to closing the entire patch. Two repair stitches were needed. After a test dose, protamine was given. Excellent hemostasis was achieved. The wound was closed in layers with interrupted 2-0 and 3-0 Vicryls and a running 4-0 Monocryl. A sterile dressing was applied. The patient tolerated the procedure well and was transported to the recovery room in stable condition.

## 2017-03-02 NOTE — INTERVAL H&P NOTE
The patient has been examined and the H&P has been reviewed:    I concur with the findings and no changes have occurred since H&P was written.    Anesthesia/Surgery risks, benefits and alternative options discussed and understood by patient/family.    Review of patient's allergies indicates:   Allergen Reactions    No known drug allergies            There are no hospital problems to display for this patient.

## 2017-03-02 NOTE — ANESTHESIA RELEASE NOTE
Anesthesia Release from PACU Note    Patient: Елена Atkinson  Anesthesia Release from PACU Note    Patient: Елена Atkinson    Procedure(s) Performed: Procedure(s) (LRB):  LIGATION/ EXCISION -FISTULA-AV  right brachial artery repair (Right)    Anesthesia type: regional    Post pain: Adequate analgesia    Post assessment: no apparent anesthetic complications, tolerated procedure well and no evidence of recall    Post vital signs:   Vitals:    03/02/17 1245   BP: (!) 150/70   Pulse: 80   Resp: 16   Temp:         Level of consciousness: awake, alert  and oriented    Nausea/Vomiting: no nausea/no vomiting    Complications: none    Airway Patency: patent    Respiratory: unassisted, spontaneous ventilation    Cardiovascular: stable and blood pressure at baseline    Hydration: euvolemic

## 2017-03-02 NOTE — BRIEF OP NOTE
Brief Operative Note  Date: 03/02/2017    Surgeon(s) and Role:     * Ana Talavera MD - Resident - Assisting     * Saroj Rockwell MD - Primary    Pre-op Diagnosis:  Aneurysm of arteriovenous dialysis fistula, initial encounter [T82.256L]; R BC AVF aneurysm - painful    Post-op Diagnosis:  Same    Procedure(s):  Excision of R BC AVF aneurysm ~8 cm  Repair of R brachial artery with basilic vein patch angioplasty    Surgeon: Saroj Rockwell MD FACS    Assistant: VIBHA Paniagua MD    Anesthesia: Local MAC    Findings/Key Components:  Successful excision and vein patch angioplasty  2+ R radial     EBL: 25 ml      Specimens     Start     Ordered    03/02/17 1009  Specimen to Pathology - Surgery  Once      03/02/17 1009        I attest to being present for the procedure and performing the case.  Saroj Rockwell MD FACS  Discharge Note  SUMMARY    Admit Date: 3/2/2017    Attending Physician: Saroj Rockwell MD FACS    Discharge Physician: Saroj Rockwell MD FACS    Discharge Date: 03/02/2017    Final Diagnosis: Aneurysm of arteriovenous dialysis fistula, initial encounter [T82.966Z]    Outcome of Treatment: Successful aneurysm exclusion    Disposition: Home or self-care    Patient Instructions:   Current Discharge Medication List      CONTINUE these medications which have NOT CHANGED    Details   alendronate (FOSAMAX) 70 MG tablet Take 70 mg by mouth every 7 days. Takes on Saturdays      allopurinol (ZYLOPRIM) 300 MG tablet Take 300 mg by mouth every morning.      cyanocobalamin (VITAMIN B-12) 100 MCG tablet Take 100 mcg by mouth after lunch.       hydrALAZINE (APRESOLINE) 25 MG tablet Take 25 mg by mouth 3 (three) times daily. Has been taking twice a day      insulin lispro (HUMALOG KWIKPEN) 100 unit/mL InPn Inject 5 Units as directed Three times a day before meals. And has a Sliding Scale      LANTUS SOLOSTAR 100 unit/mL (3 mL) InPn pen INJECT 12 UNITS EACH MORNING.  Qty: 15 mL, Refills: 11      levothyroxine (SYNTHROID) 75  MCG tablet Take 75 mcg by mouth before breakfast.      magnesium oxide (MAG-OX) 250 mg Tab Take 250 mg by mouth after lunch.      multivitamin (MULTIPLE VITAMIN) per tablet Take 1 tablet by mouth after lunch.       mycophenolate (CELLCEPT) 250 mg Cap TAKE 3 CAPSULES TWICE DAILY  Qty: 180 capsule, Refills: 11      PREDNISONE ORAL Take 4 mg by mouth every morning.      !! tacrolimus (PROGRAF) 0.5 MG Cap Take 3 capsules (1.5 mg total) by mouth once daily.      vitamin D 1000 units Tab Take 1,000 Units by mouth after lunch.       !! tacrolimus (PROGRAF) 0.5 MG Cap TAKE 3 CAPSULES ONCE A DAY.  Qty: 90 capsule, Refills: 11       !! - Potential duplicate medications found. Please discuss with provider.          Diet:  Resume pre-operative diet    Activity:  Ad jasson    Follow-up:  Follow-up in clinic with Dr Rockwell within 2-3 weeks; please call clinic nurse at

## 2017-03-02 NOTE — H&P (VIEW-ONLY)
Елена Atkinson  02/23/2017    HPI:  Patient is a 61 y.o. female with liver and kidney transplant, HTN, DM who is here today for evaluation of HD access.  Ms. Atkinson is a kidney and liver transplant recipient.  She received her living donor kidney transplant in 2009.  Her kidney has been working well. There are no issues. Prior to her kidney transplant, she was on dialysis using a Right Upper arm dialysis access.  She reports that in the last year, the dialysis access site has gotten larger.  It is not painful. She denies arm numbness or tingling. No chest pain. No SOB.     No MI/stroke  Tobacco use: Never smoker    Past Medical History   Diagnosis Date    CKD (chronic kidney disease) 8/26/2013    ESRD (end stage renal disease) 12/18/07     IgA Nephropathy    History of HCV, s/p successful Rx w/ SVR24 - 5/2016      Liver biopsy 2/6/12: Stage 1 fibrosis Completed 24 weeks Harvoni + RBV w/ SVR    Hypertension, renal     IgA nephropathy     Immunosuppressive management encounter following kidney transplant     Kidney transplant status, living related donor 8/19/2009    Obesity     Type 2 diabetes mellitus      Past Surgical History   Procedure Laterality Date    Liver transplant      Kidney transplant      Cardiac surgery      Tubal ligation       Family History   Problem Relation Age of Onset    Cancer Mother 52     lung cancer    Hypertension Mother     Kidney disease Neg Hx      Social History     Social History    Marital status:      Spouse name: N/A    Number of children: 2    Years of education: N/A     Occupational History    Not on file.     Social History Main Topics    Smoking status: Never Smoker    Smokeless tobacco: Never Used    Alcohol use No    Drug use: No    Sexual activity: No     Other Topics Concern    Not on file     Social History Narrative    Елена takes online courses and is a senior working on a degree for [employer].     Current Outpatient  Prescriptions on File Prior to Visit   Medication Sig    hydrALAZINE (APRESOLINE) 25 MG tablet Take 25 mg by mouth 3 (three) times daily.    insulin lispro (HUMALOG KWIKPEN) 100 unit/mL InPn Inject 5 Units as directed Three times a day before meals. As needed    LANTUS SOLOSTAR 100 unit/mL (3 mL) InPn pen INJECT 12 UNITS EACH MORNING. (Patient taking differently: INJECT 16 UNITS EACH MORNING.)    levothyroxine (SYNTHROID) 50 MCG tablet Take 50 mcg by mouth once daily.    magnesium oxide (MAG-OX) 400 mg tablet Take 2 tablets (800 mg total) by mouth 3 (three) times daily. (Patient taking differently: Take 250 mg by mouth once daily. )    multivitamin (MULTIPLE VITAMIN) per tablet Take 1 tablet by mouth Daily.    mycophenolate (CELLCEPT) 250 mg Cap TAKE 3 CAPSULES TWICE DAILY    predniSONE (DELTASONE) 5 MG tablet Take 1 tablet by mouth Daily.    tacrolimus (PROGRAF) 0.5 MG Cap Take 3 capsules (1.5 mg total) by mouth once daily. (Patient taking differently: Take 1 mg by mouth once daily. )    tacrolimus (PROGRAF) 0.5 MG Cap TAKE 3 CAPSULES ONCE A DAY.    vitamin D 1000 units Tab Take 1,000 Units by mouth once daily.      No current facility-administered medications on file prior to visit.        REVIEW OF SYSTEMS:  General: negative; ENT: negative; Allergy and Immunology: negative; Hematological and Lymphatic: Negative; Endocrine: negative; Respiratory: no cough, shortness of breath, or wheezing; Cardiovascular: no chest pain or dyspnea on exertion; Gastrointestinal: no abdominal pain/back, change in bowel habits, or bloody stools; Genito-Urinary: no dysuria, trouble voiding, or hematuria; Musculoskeletal: negative  Neurological: no TIA or stroke symptoms    PHYSICAL EXAM:      Pulse: 91  Temp: 98.5 °F (36.9 °C)      General appearance:  Alert, well-appearing, and in no distress.  Oriented to person, place, and time   Neurological: Normal speech, no focal findings noted; CN II - XII grossly intact            Musculoskeletal: Digits/nail without cyanosis/clubbing.  Normal muscle strength/tone.                 Neck: Supple, no significant adenopathy; thyroid is not enlarged                   No carotid bruit can be auscultated                Chest:  Clear to auscultation, no wheezes, rales or rhonchi, symmetric air entry     No use of accessory muscles             Cardiac: Normal rate and regular rhythm, S1 and S2 normal; PMI non-displaced          Abdomen: Soft, nontender, nondistended, no masses or organomegaly     No rebound tenderness noted; bowel sounds normal     Pulsatile aortic mass is not  palpable.     No groin adenopathy      Extremities:  2+ pedal pulses palpable.     No pedal edema     No ulcerations    LAB RESULTS:  Lab Results   Component Value Date    K 3.9 10/23/2015    K 3.9 10/08/2015    K 3.9 09/25/2015    CREATININE 0.8 10/23/2015    CREATININE 0.9 10/08/2015    CREATININE 0.7 09/25/2015     Lab Results   Component Value Date    WBC 4.2 (A) 10/23/2015    WBC 4.1 (A) 10/08/2015    WBC 4.6 09/25/2015    HCT 33 (A) 10/23/2015    HCT 36 10/08/2015    HCT 35 (A) 09/25/2015     (L) 03/09/2015     (L) 10/15/2012    PLT 89 (L) 09/05/2012     Lab Results   Component Value Date    HGBA1C 6.8 (H) 12/07/2010     IMAGING:    IMP/PLAN:  61 y.o. female with a history of  with liver and kidney transplant, HTN, DM who is here today for evaluation to ligate RUE HD access.   This is likely a R BC AVF - old - patent but aneurysmal    AV access ligation and revision scheduled 3/2/17  HD access duplex today on way out of clinic    Saroj Rockwell MD FACS  Vascular/Endovascular Surgery

## 2017-03-02 NOTE — DISCHARGE INSTRUCTIONS
Remove dressing in 48 hours, ok to shower and wash site, no tub bathing or submerging in water x 2 weeks.

## 2017-03-02 NOTE — ANESTHESIA PROCEDURE NOTES
Supraclavicular Nerve Single Shot Block    Patient location during procedure: OR    Reason for block: primary anesthetic   Diagnosis: ESRD   Start time: 3/2/2017 9:08 AM  Timeout: 3/2/2017 9:07 AM   End time: 3/2/2017 9:18 AM  Staffing  Anesthesiologist: EVANGELINA NIX  Resident/CRNA: ODALYS STEPHEN  Other anesthesia staff: BLAINE ALCARAZ  Performed by: resident/CRNA   Preanesthetic Checklist  Completed: patient identified, site marked, surgical consent, pre-op evaluation, timeout performed, IV checked, risks and benefits discussed and monitors and equipment checked  Peripheral Block  Patient position: supine  Prep: ChloraPrep  Patient monitoring: heart rate, cardiac monitor, continuous pulse ox, continuous capnometry and frequent blood pressure checks  Block type: supraclavicular  Laterality: right  Injection technique: single shot  Needle  Needle type: Stimuplex   Needle gauge: 22 G  Needle length: 2 in  Needle localization: anatomical landmarks and ultrasound guidance   -ultrasound image captured on disc.  Assessment  Injection assessment: negative aspiration, negative parasthesia and local visualized surrounding nerve  Paresthesia pain: none  Heart rate change: no  Slow fractionated injection: yes  Medications:  Bolus administered: 30 mL of 1.5 mepivacaine  Epinephrine added: 3.75 mcg/mL (1/300,000)  Additional Notes  Intercostal brachial field block performed for additional coverage.  VSS, see anesthesia record.  Patient tolerated procedure well.

## 2017-03-02 NOTE — PLAN OF CARE
PT tolerated procedure/anesthesia well, vs stable, no distress noted or reported, tolerated PO without difficulties, discharge instructions and scripts reviewed with pt, verbalized understanding, arm placed in sling until strength regained, consents with chart.

## 2017-03-02 NOTE — TRANSFER OF CARE
"Anesthesia Transfer of Care Note    Patient: Елена Atkinson    Procedure(s) Performed: Procedure(s) (LRB):  LIGATION/ EXCISION -FISTULA-AV  right brachial artery repair (Right)    Patient location: [facility]    Anesthesia Type: MAC and regional    Transport from OR: Transported from OR on room air with adequate spontaneous ventilation    Post pain: adequate analgesia    Post assessment: no apparent anesthetic complications and tolerated procedure well    Post vital signs: stable    Level of consciousness: awake, alert and oriented    Nausea/Vomiting: no nausea/vomiting    Complications: none          Last vitals:   Visit Vitals    BP (!) 154/76 (BP Location: Left arm, BP Method: Automatic)    Pulse 66    Temp 36.7 °C (98.1 °F) (Oral)    Resp 18    Ht 5' 3" (1.6 m)    Wt 66.7 kg (147 lb)    SpO2 99%    Breastfeeding No    BMI 26.04 kg/m2     "

## 2017-03-08 RX ORDER — MYCOPHENOLATE MOFETIL 250 MG/1
CAPSULE ORAL
Qty: 180 CAPSULE | Refills: 11 | Status: SHIPPED | OUTPATIENT
Start: 2017-03-08 | End: 2019-04-10 | Stop reason: SDUPTHER

## 2017-03-22 DIAGNOSIS — N18.6 ESRD (END STAGE RENAL DISEASE): Primary | ICD-10-CM

## 2017-03-23 ENCOUNTER — OFFICE VISIT (OUTPATIENT)
Dept: VASCULAR SURGERY | Facility: CLINIC | Age: 62
End: 2017-03-23
Payer: MEDICARE

## 2017-03-23 ENCOUNTER — HOSPITAL ENCOUNTER (OUTPATIENT)
Dept: VASCULAR SURGERY | Facility: CLINIC | Age: 62
Discharge: HOME | End: 2017-03-23
Attending: SURGERY
Payer: MEDICARE

## 2017-03-23 VITALS
SYSTOLIC BLOOD PRESSURE: 156 MMHG | HEART RATE: 90 BPM | TEMPERATURE: 97 F | WEIGHT: 159 LBS | DIASTOLIC BLOOD PRESSURE: 80 MMHG | BODY MASS INDEX: 28.17 KG/M2 | HEIGHT: 63 IN

## 2017-03-23 DIAGNOSIS — N18.6 ESRD (END STAGE RENAL DISEASE): ICD-10-CM

## 2017-03-23 DIAGNOSIS — T82.898D ANEURYSM OF ARTERIOVENOUS DIALYSIS FISTULA, SUBSEQUENT ENCOUNTER: Primary | ICD-10-CM

## 2017-03-23 PROCEDURE — 99213 OFFICE O/P EST LOW 20 MIN: CPT | Mod: PBBFAC | Performed by: SURGERY

## 2017-03-23 PROCEDURE — 99024 POSTOP FOLLOW-UP VISIT: CPT | Mod: ,,, | Performed by: SURGERY

## 2017-03-23 PROCEDURE — 99999 PR PBB SHADOW E&M-EST. PATIENT-LVL III: CPT | Mod: PBBFAC,,, | Performed by: SURGERY

## 2017-03-23 NOTE — PROGRESS NOTES
Елена Atkinson  03/23/2017    HPI:  Patient is a 62 y.o. female with liver and kidney transplant, HTN, DM who is here today for fu  I initially met her for R BC AVF aneurysm; she is a kidney and liver transplant recipient.  She received her living donor kidney transplant in 2009.  Her kidney has been working well. There are no issues. Prior to her kidney transplant, she was on dialysis using a Right Upper arm dialysis access.  She reports that in the last year, the dialysis access site has gotten larger.  It is not painful. She denies arm numbness or tingling. No chest pain. No SOB.     3/2/17:  Excision of right brachiocephalic AVF aneurysm and repair of right brachial artery with basilic vein patch angioplasty    No MI/stroke  Tobacco use: Never smoker    Past Medical History:   Diagnosis Date    CKD (chronic kidney disease) 8/26/2013    ESRD (end stage renal disease) 12/18/07    IgA Nephropathy    Gout     History of HCV, s/p successful Rx w/ SVR24 - 5/2016     Liver biopsy 2/6/12: Stage 1 fibrosis Completed 24 weeks Harvoni + RBV w/ SVR    Hypertension, renal     IgA nephropathy     Immunosuppressive management encounter following kidney transplant     Kidney transplant status, living related donor 8/19/2009    Obesity     Type 2 diabetes mellitus      Past Surgical History:   Procedure Laterality Date    CARDIAC SURGERY      KIDNEY TRANSPLANT      LIVER TRANSPLANT      TUBAL LIGATION       Family History   Problem Relation Age of Onset    Cancer Mother 52     lung cancer    Hypertension Mother     Kidney disease Neg Hx      Social History     Social History    Marital status:      Spouse name: N/A    Number of children: 2    Years of education: N/A     Occupational History    Not on file.     Social History Main Topics    Smoking status: Never Smoker    Smokeless tobacco: Never Used    Alcohol use No    Drug use: No    Sexual activity: No     Other Topics Concern    Not  on file     Social History Narrative    Елена takes [employer] and is a senior working on a degree for [employer].     Current Outpatient Prescriptions on File Prior to Visit   Medication Sig    alendronate (FOSAMAX) 70 MG tablet Take 70 mg by mouth every 7 days. Takes on Saturdays    allopurinol (ZYLOPRIM) 300 MG tablet Take 300 mg by mouth every morning.    cyanocobalamin (VITAMIN B-12) 100 MCG tablet Take 100 mcg by mouth after lunch.     docusate sodium (COLACE) 50 MG capsule Take 1 capsule (50 mg total) by mouth 2 (two) times daily.    hydrALAZINE (APRESOLINE) 25 MG tablet Take 25 mg by mouth 3 (three) times daily. Has been taking twice a day    hydrocodone-acetaminophen 5-325mg (NORCO) 5-325 mg per tablet Take 1 tablet by mouth every 4 (four) hours as needed.    insulin lispro (HUMALOG KWIKPEN) 100 unit/mL InPn Inject 5 Units as directed Three times a day before meals. And has a Sliding Scale    LANTUS SOLOSTAR 100 unit/mL (3 mL) InPn pen INJECT 12 UNITS EACH MORNING. (Patient taking differently: INJECT 16 UNITS EACH MORNING.)    levothyroxine (SYNTHROID) 75 MCG tablet Take 75 mcg by mouth before breakfast.    magnesium oxide (MAG-OX) 250 mg Tab Take 250 mg by mouth after lunch.    multivitamin (MULTIPLE VITAMIN) per tablet Take 1 tablet by mouth after lunch.     mycophenolate (CELLCEPT) 250 mg Cap TAKE 3 CAPSULES TWICE DAILY    PREDNISONE ORAL Take 4 mg by mouth every morning.    tacrolimus (PROGRAF) 0.5 MG Cap Take 3 capsules (1.5 mg total) by mouth once daily. (Patient taking differently: Take 0.5 mg by mouth 2 (two) times daily. )    tacrolimus (PROGRAF) 0.5 MG Cap TAKE 3 CAPSULES ONCE A DAY.    [DISCONTINUED] vitamin D 1000 units Tab Take 1,000 Units by mouth after lunch.      No current facility-administered medications on file prior to visit.        REVIEW OF SYSTEMS:  General: negative; ENT: negative; Allergy and Immunology: negative; Hematological and Lymphatic:  Negative; Endocrine: negative; Respiratory: no cough, shortness of breath, or wheezing; Cardiovascular: no chest pain or dyspnea on exertion; Gastrointestinal: no abdominal pain/back, change in bowel habits, or bloody stools; Genito-Urinary: no dysuria, trouble voiding, or hematuria; Musculoskeletal: negative  Neurological: no TIA or stroke symptoms    PHYSICAL EXAM:       Vitals:    03/23/17 1448   BP: (!) 156/80   Pulse: 90   Temp: 97.4 °F (36.3 °C)     Pulse: 90  Temp: 97.4 °F (36.3 °C)      General appearance:  Alert, well-appearing, and in no distress.  Oriented to person, place, and time   Neurological: Normal speech, no focal findings noted; CN II - XII grossly intact           Musculoskeletal: Digits/nail without cyanosis/clubbing.  Normal muscle strength/tone.                 Neck: Supple, no significant adenopathy; thyroid is not enlarged                   No carotid bruit can be auscultated                Chest:  Clear to auscultation, no wheezes, rales or rhonchi, symmetric air entry     No use of accessory muscles             Cardiac: Normal rate and regular rhythm, S1 and S2 normal; PMI non-displaced          Abdomen: Soft, nontender, nondistended, no masses or organomegaly     No rebound tenderness noted; bowel sounds normal     Pulsatile aortic mass is not  palpable.     No groin adenopathy      Extremities:   Well-healed incision; 2+ R radial      2+ pedal pulses palpable.     No pedal edema     No ulcerations    LAB RESULTS:  Lab Results   Component Value Date    K 3.7 03/02/2017    K 4.0 02/23/2017    K 3.9 10/23/2015    CREATININE 0.7 03/02/2017    CREATININE 0.7 02/23/2017    CREATININE 0.8 10/23/2015     Lab Results   Component Value Date    WBC 6.19 03/02/2017    WBC 9.73 02/23/2017    WBC 4.2 (A) 10/23/2015    HCT 40.1 03/02/2017    HCT 40.7 02/23/2017    HCT 33 (A) 10/23/2015     (L) 03/02/2017     02/23/2017     (L) 03/09/2015     Lab Results   Component Value Date     HGBA1C 6.8 (H) 12/07/2010     IMAGING:  None    IMP/PLAN:  62 y.o. female with a history of  with liver and kidney transplant, HTN, DM doing well  Fu PRN    Saroj Rockwell MD FACS  Vascular/Endovascular Surgery

## 2017-03-28 ENCOUNTER — PATIENT MESSAGE (OUTPATIENT)
Dept: VASCULAR SURGERY | Facility: CLINIC | Age: 62
End: 2017-03-28

## 2017-04-13 ENCOUNTER — TELEPHONE (OUTPATIENT)
Dept: TRANSPLANT | Facility: CLINIC | Age: 62
End: 2017-04-13

## 2017-04-20 LAB
EXT ALBUMIN: 3.5
EXT ALKALINE PHOSPHATASE: 82
EXT ALT: 27
EXT AST: 17
EXT BASOPHIL%: 0.6
EXT BILIRUBIN TOTAL: 0.6
EXT BUN: 9
EXT CALCIUM: 8.6
EXT CHLORIDE: 108
EXT CO2: 27
EXT CREATININE: 0.61 MG/DL
EXT EOSINOPHIL%: 3.5
EXT GFR MDRD AF AMER: >60
EXT GFR MDRD NON AF AMER: >60
EXT GLUCOSE: 174
EXT HEMATOCRIT: 40.6
EXT HEMOGLOBIN: 13.7
EXT LYMPH%: 11.2
EXT MONOCYTES%: 7.4
EXT PLATELETS: 159
EXT POTASSIUM: 4
EXT PROTEIN TOTAL: 6
EXT SEGS%: 76.7
EXT SODIUM: 143 MMOL/L
EXT TACROLIMUS LVL: 5
EXT WBC: 6.6
HCV RNA QUANT PCR: NOT DETECTED

## 2017-04-21 ENCOUNTER — TELEPHONE (OUTPATIENT)
Dept: TRANSPLANT | Facility: CLINIC | Age: 62
End: 2017-04-21

## 2017-08-09 ENCOUNTER — PATIENT MESSAGE (OUTPATIENT)
Dept: TRANSPLANT | Facility: CLINIC | Age: 62
End: 2017-08-09

## 2017-08-22 ENCOUNTER — TELEPHONE (OUTPATIENT)
Dept: TRANSPLANT | Facility: CLINIC | Age: 62
End: 2017-08-22

## 2017-08-22 ENCOUNTER — PATIENT MESSAGE (OUTPATIENT)
Dept: TRANSPLANT | Facility: CLINIC | Age: 62
End: 2017-08-22

## 2017-08-24 LAB
EXT ALBUMIN: 3.6
EXT ALKALINE PHOSPHATASE: 85
EXT ALT: 24
EXT AST: 17
EXT BASOPHIL%: 0.8
EXT BILIRUBIN TOTAL: 0.7
EXT BUN: 12
EXT CALCIUM: 8.9
EXT CHLORIDE: 110
EXT CO2: 26
EXT CREATININE: 0.71 MG/DL
EXT EOSINOPHIL%: 3.5
EXT GFR MDRD AF AMER: >60
EXT GFR MDRD NON AF AMER: >60
EXT GLUCOSE: 175
EXT HEMATOCRIT: 40.4
EXT HEMOGLOBIN: 13.5
EXT LYMPH%: 13
EXT MONOCYTES%: 8
EXT PLATELETS: 157
EXT POTASSIUM: 3.9
EXT PROTEIN TOTAL: 6.1
EXT SEGS%: 73.9
EXT SODIUM: 142 MMOL/L
EXT TACROLIMUS LVL: 4.8
EXT WBC: 6.5

## 2017-08-25 ENCOUNTER — TELEPHONE (OUTPATIENT)
Dept: TRANSPLANT | Facility: CLINIC | Age: 62
End: 2017-08-25

## 2017-11-16 LAB
EXT ALBUMIN: 3.5
EXT ALKALINE PHOSPHATASE: 74
EXT ALT: 21
EXT AST: 14
EXT BASOPHIL%: 0.5
EXT BILIRUBIN TOTAL: 0.6
EXT BUN: 11
EXT CALCIUM: 8.8
EXT CHLORIDE: 108
EXT CO2: 28
EXT CREATININE: 0.52 MG/DL
EXT EOSINOPHIL%: 1.5
EXT GLUCOSE: 160
EXT HCV QUANT: NOT DETECTED
EXT HEMATOCRIT: 40.5
EXT HEMOGLOBIN: 13.8
EXT LYMPH%: 6.7
EXT MONOCYTES%: 4.7
EXT PLATELETS: 151
EXT POTASSIUM: 3.8
EXT PROTEIN TOTAL: 6.1
EXT SEGS%: 86.1
EXT SODIUM: 143 MMOL/L
EXT TACROLIMUS LVL: 6.7
EXT WBC: 8

## 2017-11-22 ENCOUNTER — TELEPHONE (OUTPATIENT)
Dept: TRANSPLANT | Facility: CLINIC | Age: 62
End: 2017-11-22

## 2017-11-22 NOTE — TELEPHONE ENCOUNTER
----- Message from Jeremiah Contreras MD sent at 11/21/2017  4:28 PM CST -----  Results reviewed

## 2018-02-05 ENCOUNTER — TELEPHONE (OUTPATIENT)
Dept: TRANSPLANT | Facility: CLINIC | Age: 63
End: 2018-02-05

## 2018-02-05 NOTE — TELEPHONE ENCOUNTER
----- Message from Ebonie Bell RN sent at 2/5/2018  2:12 PM CST -----  Contact: patient      ----- Message -----  From: Amy San MA  Sent: 2/5/2018   2:03 PM  To: [facility] Clinical        ----- Message -----  From: Coleen Kimball  Sent: 2/2/2018   1:52 PM  To: [facility] Non-Clinical Staff    Patient calling to schedule her appt with julia to the end of march, patient also want her lab orders to [facility]     Please call

## 2018-02-28 ENCOUNTER — TELEPHONE (OUTPATIENT)
Dept: TRANSPLANT | Facility: CLINIC | Age: 63
End: 2018-02-28

## 2018-03-02 LAB
EXT ALBUMIN: 3.4
EXT ALKALINE PHOSPHATASE: 81
EXT ALT: 27
EXT AST: 20
EXT BASOPHIL%: 0.8
EXT BILIRUBIN TOTAL: 0.6
EXT BUN: 16
EXT CALCIUM: 8.8
EXT CHLORIDE: 109
EXT CO2: 26
EXT CREATININE: 0.51 MG/DL
EXT EOSINOPHIL%: 4.5
EXT GFR MDRD AF AMER: >60
EXT GFR MDRD NON AF AMER: >60
EXT GLUCOSE: 151
EXT HEMATOCRIT: 42
EXT HEMOGLOBIN: 14.2
EXT LYMPH%: 13.1
EXT MONOCYTES%: 7.4
EXT PLATELETS: 168
EXT POTASSIUM: 3.8
EXT PROTEIN TOTAL: 6
EXT SEGS%: 73.5
EXT SODIUM: 142 MMOL/L
EXT TACROLIMUS LVL: 6.5
EXT WBC: 5.9
HCV RNA # SERPL NAA+PROBE: NOT DETECTED {COPIES}/ML

## 2018-03-09 ENCOUNTER — TELEPHONE (OUTPATIENT)
Dept: TRANSPLANT | Facility: CLINIC | Age: 63
End: 2018-03-09

## 2018-03-15 RX ORDER — MYCOPHENOLATE MOFETIL 250 MG/1
CAPSULE ORAL
Qty: 180 CAPSULE | OUTPATIENT
Start: 2018-03-15

## 2018-04-23 ENCOUNTER — OFFICE VISIT (OUTPATIENT)
Dept: TRANSPLANT | Facility: CLINIC | Age: 63
End: 2018-04-23
Payer: MEDICARE

## 2018-04-23 VITALS
OXYGEN SATURATION: 98 % | BODY MASS INDEX: 29.34 KG/M2 | RESPIRATION RATE: 16 BRPM | HEIGHT: 63 IN | DIASTOLIC BLOOD PRESSURE: 73 MMHG | HEART RATE: 73 BPM | SYSTOLIC BLOOD PRESSURE: 147 MMHG | TEMPERATURE: 98 F | WEIGHT: 165.56 LBS

## 2018-04-23 DIAGNOSIS — Z86.19 HISTORY OF HEPATITIS C: ICD-10-CM

## 2018-04-23 DIAGNOSIS — Z79.899 IMMUNOSUPPRESSIVE MANAGEMENT ENCOUNTER FOLLOWING KIDNEY TRANSPLANT: Chronic | ICD-10-CM

## 2018-04-23 DIAGNOSIS — Z94.0 KIDNEY TRANSPLANT STATUS, LIVING RELATED DONOR: Chronic | ICD-10-CM

## 2018-04-23 DIAGNOSIS — Z94.4 LIVER REPLACED BY TRANSPLANT: Primary | ICD-10-CM

## 2018-04-23 DIAGNOSIS — Z94.0 IMMUNOSUPPRESSIVE MANAGEMENT ENCOUNTER FOLLOWING KIDNEY TRANSPLANT: Chronic | ICD-10-CM

## 2018-04-23 PROCEDURE — 99215 OFFICE O/P EST HI 40 MIN: CPT | Mod: S$PBB,,, | Performed by: INTERNAL MEDICINE

## 2018-04-23 PROCEDURE — 99214 OFFICE O/P EST MOD 30 MIN: CPT | Mod: PBBFAC | Performed by: INTERNAL MEDICINE

## 2018-04-23 PROCEDURE — 99999 PR PBB SHADOW E&M-EST. PATIENT-LVL IV: CPT | Mod: PBBFAC,,, | Performed by: INTERNAL MEDICINE

## 2018-04-23 RX ORDER — ESCITALOPRAM OXALATE 5 MG/5ML
10 SOLUTION ORAL DAILY
COMMUNITY
End: 2023-01-03

## 2018-04-23 NOTE — PROGRESS NOTES
Subjective:       Patient ID: Елена Atkinson is a 63 y.o. female.    Chief Complaint: Liver Transplant Follow-up    HPI  Ms. Atkinson is a 63 y.o. year old  female who received a  donor liver transplant for HCV cirrhosis at [facility] in 2003.    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    Donor CMV Status: Negative   Recipient CMV Status: Positive     She had mild recurrent HCV infection. Her last liver biopsy was in 2012 but she had an elastography test in 2015.  For Harvoni/riba 24 weeks- completed on 10/2015  SVR 12 on 2016    - 2015 elastography showed F2-3  - G1a      Labs 3/1/2018  Currently LFTs/Creat are normal  Last Tac level was 4.6    She tells me that she had an episode of KRISTOFER in 2015 from which she recovered completely.    Last liver biopsy: 2012  NO EVIDENCE OF REJECTION.  CHRONIC HEPATITIS C WITH MILD ACTIVITY  AND RARE APOPTOTIC BODIES (GRADE 1-2 OUT OF 4).  PORTAL FIBROSIS (STAGE 1 OUT OF 4)  NO EVIDENCE OF CIRRHOSIS.  STEATOSIS, LESS THAN 1%.  NO IRON.  IRON AND TRICHROME WITH APPROPRIATE CONTROLS    Abdominal US: 2015  Normal    BP today: 147/73  Body mass index is 29.33 kg/m².        Lab Results   Component Value Date    ALT 26 10/23/2015    AST 23 10/23/2015     (H) 2011    ALKPHOS 127 2015    BILITOT 0.7 2015     Past Medical History:   Diagnosis Date    CKD (chronic kidney disease) 2013    ESRD (end stage renal disease) 07    IgA Nephropathy    Gout     History of HCV, s/p successful Rx w/ SVR - 2016     Liver biopsy 12: Stage 1 fibrosis Completed 24 weeks Harvoni + RBV w/ SVR    Hypertension, renal     IgA nephropathy     Immunosuppressive management encounter following kidney transplant     Kidney transplant status, living related donor  8/19/2009    Obesity     Type 2 diabetes mellitus      Past Surgical History:   Procedure Laterality Date    CARDIAC SURGERY      KIDNEY TRANSPLANT      LIVER TRANSPLANT      TUBAL LIGATION       Current Outpatient Prescriptions   Medication Sig    alendronate (FOSAMAX) 70 MG tablet Take 70 mg by mouth every 7 days. Takes on Saturdays    allopurinol (ZYLOPRIM) 300 MG tablet Take 300 mg by mouth every morning.    cyanocobalamin (VITAMIN B-12) 100 MCG tablet Take 100 mcg by mouth after lunch.     docusate sodium (COLACE) 50 MG capsule Take 1 capsule (50 mg total) by mouth 2 (two) times daily.    escitalopram oxalate (LEXAPRO) 5 mg/5 mL Soln Take 10 mg by mouth once daily.    hydrALAZINE (APRESOLINE) 25 MG tablet Take 25 mg by mouth 2 (two) times daily. Has been taking twice a day    hydrocodone-acetaminophen 5-325mg (NORCO) 5-325 mg per tablet Take 1 tablet by mouth every 4 (four) hours as needed.    insulin lispro (HUMALOG KWIKPEN) 100 unit/mL InPn Inject 5 Units as directed Three times a day before meals. And has a Sliding Scale    LANTUS SOLOSTAR 100 unit/mL (3 mL) InPn pen INJECT 12 UNITS EACH MORNING. (Patient taking differently: INJECT 16 UNITS EACH MORNING.)    levothyroxine (SYNTHROID) 75 MCG tablet Take 75 mcg by mouth before breakfast.    magnesium oxide (MAG-OX) 250 mg Tab Take 250 mg by mouth after lunch.    multivitamin (MULTIPLE VITAMIN) per tablet Take 1 tablet by mouth after lunch.     mycophenolate (CELLCEPT) 250 mg Cap TAKE 3 CAPSULES TWICE DAILY    PREDNISONE ORAL Take 4 mg by mouth every morning.    tacrolimus (PROGRAF) 0.5 MG Cap Take 3 capsules (1.5 mg total) by mouth once daily. (Patient taking differently: Take 0.5 mg by mouth 2 (two) times daily. )    tacrolimus (PROGRAF) 0.5 MG Cap TAKE 3 CAPSULES ONCE A DAY.     No current facility-administered medications for this visit.      Review of Systems   Constitutional: Negative for activity change, appetite change, chills,  fatigue, fever and unexpected weight change.   HENT: Negative for ear pain, hearing loss, nosebleeds, sore throat and trouble swallowing.    Eyes: Negative for redness and visual disturbance.   Respiratory: Negative for cough, chest tightness, shortness of breath and wheezing.    Cardiovascular: Negative for chest pain and palpitations.   Gastrointestinal: Negative for abdominal distention, abdominal pain, blood in stool, constipation, diarrhea, nausea and vomiting.   Genitourinary: Negative for difficulty urinating, dysuria, frequency, hematuria and urgency.   Musculoskeletal: Negative for arthralgias, back pain, gait problem, joint swelling and myalgias.   Skin: Negative for rash.   Neurological: Negative for tremors, seizures, speech difficulty, weakness and headaches.   Hematological: Negative for adenopathy.   Psychiatric/Behavioral: Negative for confusion, decreased concentration and sleep disturbance. The patient is not nervous/anxious.          Objective:      Physical Exam   Constitutional: She appears well-developed and well-nourished. No distress.   HENT:   Head: Normocephalic.   Eyes: Pupils are equal, round, and reactive to light.   Neck: No JVD present. No tracheal deviation present. No thyromegaly present.   Cardiovascular: Normal rate, regular rhythm and normal heart sounds.    No murmur heard.  Pulmonary/Chest: Effort normal and breath sounds normal. No stridor.   Abdominal: Soft.   Lymphadenopathy:        Head (right side): No submental, no submandibular, no tonsillar, no preauricular, no posterior auricular and no occipital adenopathy present.        Head (left side): No submental, no submandibular, no tonsillar, no preauricular, no posterior auricular and no occipital adenopathy present.     She has no cervical adenopathy.     She has no axillary adenopathy.   Neurological: She is alert. She has normal strength. She is not disoriented. No cranial nerve deficit or sensory deficit.   Skin: Skin is  intact. No cyanosis.       Assessment:       1. Liver replaced by transplant for HCV 7/2003    2. Kidney transplant status, living related donor 8/19/2009    3. Immunosuppressive management encounter following kidney transplant    4. History of HCV, s/p successful Rx w/ SVR24 - 5/2016        Plan:   1. No change in immunosuppression- Tac/MMF 750mg BID and predn 4mg daily  2. Ongoing kidney Tx follow up in [place]- BP monitored by nephrologist  3. Labs every 3 months  4. Has ongoing dermatology surveillance  5. Clinic in about 1 year      Functional Status: 100% - Normal, no complaints, no evidence of disease  Physical Capacity: No Limitations

## 2018-04-23 NOTE — LETTER
April 23, 2018        Grayson Hawthorne  [address]  [facility] [place]  [place] [address]  Phone: [phone]  Fax: [phone]     Gianni Alonso  [address]   [place] [address]  Phone: [phone]  Fax: [phone]             [facility] Liver Transplant  [address]  [facility] [address]  Phone: [phone]   Patient: Елена Atkinson   MR Number: [id number]   YOB: 1955   Date of Visit: 4/23/2018       Dear Dr. Grayson Hawthorne, Gianni Alonso    Thank you for referring Елена Atkinson to me for evaluation. Attached you will find relevant portions of my assessment and plan of care.    If you have questions, please do not hesitate to call me. I look forward to following Елена Atkinson along with you.    Sincerely,    Jeremiah Contreras MD    Enclosure    If you would like to receive this communication electronically, please contact [email or web address] or [phone] to request [employer] access.    [employer] is a tool which provides read-only access to select patient information with whom you have a relationship. Its easy to use and provides real time access to review your patients record including encounter summaries, notes, results, and demographic information.    If you feel you have received this communication in error or would no longer like to receive these types of communications, please e-mail [email or web address]

## 2018-06-29 ENCOUNTER — TELEPHONE (OUTPATIENT)
Dept: TRANSPLANT | Facility: CLINIC | Age: 63
End: 2018-06-29

## 2018-06-29 NOTE — LETTER
June 29, 2018    Елена Atkinson  [address] [facility] [place] [address]          Dear Елена Atkinson:  MRN: [id number]    Your lab work was due to be drawn on 06/04/2018.  We contacted your lab and were unable to get test results.  It is very important to get your labs drawn as scheduled.  We cannot monitor you for rejection, infections, or drug toxicity side effects without lab results.  Please call us at [phone] as soon as possible to let us know when you plan to have labs drawn.    Sincerely,        Your Liver Transplant Coordinator    [facility]  [address] [place] [id number]  [phone]

## 2018-07-13 ENCOUNTER — TELEPHONE (OUTPATIENT)
Dept: TRANSPLANT | Facility: CLINIC | Age: 63
End: 2018-07-13

## 2018-07-13 LAB
EXT ALBUMIN: 3.5
EXT ALKALINE PHOSPHATASE: 85
EXT ALT: 26
EXT AST: 18
EXT BASOPHIL%: 0.7
EXT BILIRUBIN TOTAL: 0.6
EXT BUN: 17
EXT CALCIUM: 8.7
EXT CHLORIDE: 110
EXT CO2: 27
EXT CREATININE: 0.57 MG/DL
EXT EOSINOPHIL%: 4.9
EXT GFR MDRD AF AMER: >90
EXT GFR MDRD NON AF AMER: >90
EXT GLUCOSE: 190
EXT HCV QUANT: NOT DETECTED
EXT HEMATOCRIT: 43.8
EXT HEMOGLOBIN: 14.6
EXT LYMPH%: 11.8
EXT MONOCYTES%: 8
EXT PLATELETS: 175
EXT POTASSIUM: 3.9
EXT PROTEIN TOTAL: 6
EXT SEGS%: 73.7
EXT SODIUM: 142 MMOL/L
EXT TACROLIMUS LVL: 5.8
EXT WBC: 7.5

## 2018-07-26 LAB
EXT ALBUMIN: 3.5
EXT ALKALINE PHOSPHATASE: 85
EXT ALT: 26
EXT AST: 18
EXT BASOPHIL%: 0.7
EXT BILIRUBIN TOTAL: 0.6
EXT BUN: 17
EXT CALCIUM: 8.7
EXT CHLORIDE: 110
EXT CO2: 27
EXT CREATININE: 0.57 MG/DL
EXT EOSINOPHIL%: 4.9
EXT GFR MDRD AF AMER: >90
EXT GFR MDRD NON AF AMER: >90
EXT GLUCOSE: 190
EXT HEMATOCRIT: 43.8
EXT HEMOGLOBIN: 14.6
EXT LYMPH%: 11.8
EXT MONOCYTES%: 8
EXT PLATELETS: 175
EXT POTASSIUM: 3.9
EXT PROTEIN TOTAL: 6
EXT SEGS%: 73.7
EXT SODIUM: 142 MMOL/L
EXT TACROLIMUS LVL: 5.8
EXT WBC: 7.5

## 2018-07-27 ENCOUNTER — TELEPHONE (OUTPATIENT)
Dept: TRANSPLANT | Facility: CLINIC | Age: 63
End: 2018-07-27

## 2018-11-08 ENCOUNTER — TELEPHONE (OUTPATIENT)
Dept: TRANSPLANT | Facility: CLINIC | Age: 63
End: 2018-11-08

## 2018-11-08 NOTE — TELEPHONE ENCOUNTER
Called pt in regards to having labs drawn no answer. Unable to leave VM. Will send missed lab letter reminder card for 11/26

## 2018-11-08 NOTE — LETTER
November 8, 2018    Елена Atkinson  [address] [facility] [place] [address]          Dear Елена Atkinson:  MRN: [id number]    Your lab work was due to be drawn on 10/29/2018.  We contacted your lab and were unable to get test results.  It is very important to get your labs drawn as scheduled.  We cannot monitor you for rejection, infections, or drug toxicity side effects without lab results.   Please call us at [phone] as soon as possible to let us know when you plan to have labs drawn.    Sincerely,        Your Liver Transplant Coordinator    [facility]  [address] [place] [id number]  [phone]

## 2018-11-20 ENCOUNTER — PATIENT MESSAGE (OUTPATIENT)
Dept: TRANSPLANT | Facility: CLINIC | Age: 63
End: 2018-11-20

## 2018-11-20 LAB
EXT ALBUMIN: 4
EXT ALKALINE PHOSPHATASE: 75
EXT ALT: 22
EXT AST: 24
EXT BASOPHIL%: 0.5
EXT BILIRUBIN TOTAL: 0.7
EXT BUN: 13
EXT CALCIUM: 8.5
EXT CHLORIDE: 108
EXT CO2: 25
EXT CREATININE: 0.6 MG/DL
EXT EOSINOPHIL%: 3.1
EXT GFR MDRD AF AMER: >90
EXT GFR MDRD NON AF AMER: >90
EXT GLUCOSE: 190
EXT HEMATOCRIT: 43.7
EXT HEMOGLOBIN: 14.5
EXT LYMPH%: 9.8
EXT MONOCYTES%: 6.8
EXT PLATELETS: 181
EXT POTASSIUM: 3.7
EXT PROTEIN TOTAL: 5.9
EXT PROTEIN UA: NEGATIVE
EXT SEGS%: 78.9
EXT SODIUM: 145 MMOL/L
EXT TACROLIMUS LVL: 5.5
EXT WBC: 7.8

## 2018-11-23 ENCOUNTER — TELEPHONE (OUTPATIENT)
Dept: TRANSPLANT | Facility: CLINIC | Age: 63
End: 2018-11-23

## 2018-11-23 NOTE — TELEPHONE ENCOUNTER
----- Message from Jeremiah Contreras MD sent at 11/23/2018  1:41 PM CST -----  Results reviewed

## 2019-02-11 ENCOUNTER — TELEPHONE (OUTPATIENT)
Dept: TRANSPLANT | Facility: CLINIC | Age: 64
End: 2019-02-11

## 2019-02-13 LAB
EXT ALBUMIN: 3.7
EXT ALKALINE PHOSPHATASE: 80
EXT ALT: 20
EXT AST: 24
EXT BILIRUBIN TOTAL: 0.5
EXT BUN: 13
EXT CALCIUM: 8.6
EXT CHLORIDE: 110
EXT CO2: 29
EXT CREATININE: 0.6 MG/DL
EXT GLUCOSE: 173
EXT HEMATOCRIT: 42.4
EXT HEMOGLOBIN: 14.2
EXT PLATELETS: 181
EXT POTASSIUM: 4
EXT PROTEIN TOTAL: 5.3
EXT SODIUM: 146 MMOL/L
EXT TACROLIMUS LVL: 5.4
EXT WBC: 5.8

## 2019-03-11 ENCOUNTER — TELEPHONE (OUTPATIENT)
Dept: TRANSPLANT | Facility: CLINIC | Age: 64
End: 2019-03-11

## 2019-03-11 NOTE — TELEPHONE ENCOUNTER
----- Message from Lan Mendosa sent at 3/11/2019  4:04 PM CDT -----  Contact: patient   Please call pt at [phone]    Patient has a questions about labs ([facility]/13[facility])    Thank you

## 2019-03-13 ENCOUNTER — TELEPHONE (OUTPATIENT)
Dept: TRANSPLANT | Facility: CLINIC | Age: 64
End: 2019-03-13

## 2019-03-13 NOTE — TELEPHONE ENCOUNTER
----- Message from Mansi Ayers sent at 3/13/2019 11:46 AM CDT -----  Contact: Patient  Needs Advice    Reason for call: Called to make sure Ebonie received her results from [facility]        Communication Preference: [phone]    Additional Information: n/a

## 2019-03-19 ENCOUNTER — TELEPHONE (OUTPATIENT)
Dept: TRANSPLANT | Facility: CLINIC | Age: 64
End: 2019-03-19

## 2019-03-19 NOTE — TELEPHONE ENCOUNTER
----- Message from Carine Beltre MA sent at 3/19/2019  2:54 PM CDT -----  Contact: patient  Have you received her labs   ----- Message -----  From: Mansi Ayers  Sent: 3/19/2019   1:46 PM  To: [facility] Post-Liver Transplant Non-Clinical    Needs Advice    Reason for call: Wishes to know if know her labs from 2/2019 was ever received from [facility].        Communication Preference: [phone]    Additional Information: n/a

## 2019-04-10 RX ORDER — MYCOPHENOLATE MOFETIL 250 MG/1
CAPSULE ORAL
Qty: 180 CAPSULE | Refills: 11 | Status: SHIPPED | OUTPATIENT
Start: 2019-04-10 | End: 2019-10-10 | Stop reason: SDUPTHER

## 2019-05-28 ENCOUNTER — PATIENT MESSAGE (OUTPATIENT)
Dept: TRANSPLANT | Facility: CLINIC | Age: 64
End: 2019-05-28

## 2019-05-28 LAB
EXT ALBUMIN: 4
EXT ALKALINE PHOSPHATASE: 66
EXT ALT: 17
EXT AST: 22
EXT BILIRUBIN TOTAL: 0.4
EXT BUN: 21
EXT CALCIUM: 8.8
EXT CHLORIDE: 109
EXT CO2: 28
EXT CREATININE: 0.64 MG/DL
EXT EOSINOPHIL%: 8
EXT GLUCOSE: 143
EXT HEMATOCRIT: 38.5
EXT HEMOGLOBIN: 12.6
EXT LYMPH%: 8
EXT MONOCYTES%: 6
EXT PLATELETS: 173
EXT POTASSIUM: 4.3
EXT PROTEIN TOTAL: 5.6
EXT SEGS%: 78
EXT SODIUM: 145 MMOL/L
EXT TACROLIMUS LVL: 5.7
EXT WBC: 6.6

## 2019-05-31 ENCOUNTER — TELEPHONE (OUTPATIENT)
Dept: TRANSPLANT | Facility: CLINIC | Age: 64
End: 2019-05-31

## 2019-08-01 ENCOUNTER — TELEPHONE (OUTPATIENT)
Dept: TRANSPLANT | Facility: CLINIC | Age: 64
End: 2019-08-01

## 2019-08-01 NOTE — TELEPHONE ENCOUNTER
----- Message from Zoila Goel sent at 8/1/2019  2:18 PM CDT -----  Needs Advice    Reason for call: Verify if coordinator received lab results from [facility]?        Communication Preference: [phone]     Additional Information: Pt had labs done on 7/11/19

## 2019-08-30 ENCOUNTER — TELEPHONE (OUTPATIENT)
Dept: TRANSPLANT | Facility: CLINIC | Age: 64
End: 2019-08-30

## 2019-09-22 ENCOUNTER — PATIENT MESSAGE (OUTPATIENT)
Dept: TRANSPLANT | Facility: CLINIC | Age: 64
End: 2019-09-22

## 2019-09-30 ENCOUNTER — TELEPHONE (OUTPATIENT)
Dept: TRANSPLANT | Facility: CLINIC | Age: 64
End: 2019-09-30

## 2019-09-30 LAB
EXT ALBUMIN: 4
EXT ALKALINE PHOSPHATASE: 65
EXT ALT: 16
EXT AST: 18
EXT BASOPHIL%: 0.5
EXT BILIRUBIN TOTAL: 0.6
EXT BUN: 15
EXT CALCIUM: 8.8
EXT CHLORIDE: 107
EXT CO2: 27
EXT CREATININE: 0.61 MG/DL
EXT EOSINOPHIL%: 3.8
EXT GFR MDRD AF AMER: >90
EXT GFR MDRD NON AF AMER: >90
EXT GLUCOSE: 155
EXT HEMATOCRIT: 40.5
EXT HEMOGLOBIN: 12.8
EXT LYMPH%: 8.6
EXT MONOCYTES%: 7.1
EXT PLATELETS: 199
EXT POTASSIUM: 4.4
EXT PROTEIN TOTAL: 5.7
EXT SEGS%: 79.5
EXT SODIUM: 142 MMOL/L
EXT TACROLIMUS LVL: 5.9
EXT WBC: 7.4

## 2019-09-30 NOTE — TELEPHONE ENCOUNTER
----- Message from Zoila Goel sent at 9/30/2019  1:16 PM CDT -----  Contact: Pt  Calling to verify if Dr. Contreras received the 9/22 lab results from [facility]?    Contact: [phone]

## 2019-10-04 ENCOUNTER — TELEPHONE (OUTPATIENT)
Dept: TRANSPLANT | Facility: CLINIC | Age: 64
End: 2019-10-04

## 2019-10-07 ENCOUNTER — OFFICE VISIT (OUTPATIENT)
Dept: TRANSPLANT | Facility: CLINIC | Age: 64
End: 2019-10-07
Payer: MEDICARE

## 2019-10-07 VITALS
WEIGHT: 177.25 LBS | BODY MASS INDEX: 32.62 KG/M2 | OXYGEN SATURATION: 97 % | RESPIRATION RATE: 16 BRPM | HEIGHT: 62 IN | SYSTOLIC BLOOD PRESSURE: 133 MMHG | TEMPERATURE: 98 F | DIASTOLIC BLOOD PRESSURE: 66 MMHG | HEART RATE: 87 BPM

## 2019-10-07 DIAGNOSIS — Z86.19 HISTORY OF HEPATITIS C: ICD-10-CM

## 2019-10-07 DIAGNOSIS — Z29.89 PROPHYLACTIC IMMUNOTHERAPY: ICD-10-CM

## 2019-10-07 DIAGNOSIS — Z94.4 LIVER REPLACED BY TRANSPLANT: Primary | ICD-10-CM

## 2019-10-07 DIAGNOSIS — Z94.0 KIDNEY TRANSPLANT STATUS, LIVING RELATED DONOR: Chronic | ICD-10-CM

## 2019-10-07 PROCEDURE — 99999 PR PBB SHADOW E&M-EST. PATIENT-LVL III: CPT | Mod: PBBFAC,,, | Performed by: INTERNAL MEDICINE

## 2019-10-07 PROCEDURE — 99215 PR OFFICE/OUTPT VISIT, EST, LEVL V, 40-54 MIN: ICD-10-PCS | Mod: S$PBB,,, | Performed by: INTERNAL MEDICINE

## 2019-10-07 PROCEDURE — 99999 PR PBB SHADOW E&M-EST. PATIENT-LVL III: ICD-10-PCS | Mod: PBBFAC,,, | Performed by: INTERNAL MEDICINE

## 2019-10-07 PROCEDURE — 99213 OFFICE O/P EST LOW 20 MIN: CPT | Mod: PBBFAC | Performed by: INTERNAL MEDICINE

## 2019-10-07 PROCEDURE — 99215 OFFICE O/P EST HI 40 MIN: CPT | Mod: S$PBB,,, | Performed by: INTERNAL MEDICINE

## 2019-10-07 RX ORDER — IRBESARTAN 150 MG/1
150 TABLET ORAL DAILY
COMMUNITY
Start: 2019-08-27 | End: 2022-04-04

## 2019-10-07 RX ORDER — METFORMIN HYDROCHLORIDE 500 MG/1
500 TABLET, EXTENDED RELEASE ORAL 2 TIMES DAILY
COMMUNITY
Start: 2019-04-04 | End: 2023-01-03 | Stop reason: SDUPTHER

## 2019-10-07 RX ORDER — INSULIN GLARGINE 300 U/ML
36 INJECTION, SOLUTION SUBCUTANEOUS DAILY
COMMUNITY
Start: 2019-09-14 | End: 2022-10-18

## 2019-10-07 RX ORDER — TEMAZEPAM 15 MG/1
15 CAPSULE ORAL DAILY PRN
COMMUNITY
Start: 2018-11-16 | End: 2023-01-03 | Stop reason: SDUPTHER

## 2019-10-07 NOTE — PROGRESS NOTES
Subjective:       Patient ID: Елена Atkinson is a 64 y.o. female.    Chief Complaint: No chief complaint on file.    HPI  Ms. Atkinson is a 64 y.o. year old  female who received a  donor liver transplant for HCV cirrhosis at [facility] in 2003.    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    Donor CMV Status: Negative   Recipient CMV Status: Positive     She had mild recurrent HCV infection. Her last liver biopsy was in 2012 but she had an elastography test in 2015.  For Harvoni/riba 24 weeks- completed on 10/2015  SVR 12 on 2016    - 2015 elastography showed F2-3  - G1a    Labs 2019  Currently LFTs/Creat are normal  Last Tac level was 5.9    She tells me that she had an episode of KRISTOFER in 2015 from which she recovered completely.    Last liver biopsy: 2012  NO EVIDENCE OF REJECTION.  CHRONIC HEPATITIS C WITH MILD ACTIVITY  AND RARE APOPTOTIC BODIES (GRADE 1-2 OUT OF 4).  PORTAL FIBROSIS (STAGE 1 OUT OF 4)  NO EVIDENCE OF CIRRHOSIS.  STEATOSIS, LESS THAN 1%.  NO IRON.  IRON AND TRICHROME WITH APPROPRIATE CONTROLS    Abdominal US: 2015  Normal    Body mass index is 32.42 kg/m².        Lab Results   Component Value Date    ALT 26 10/23/2015    AST 23 10/23/2015     (H) 2011    ALKPHOS 127 2015    BILITOT 0.7 2015     Past Medical History:   Diagnosis Date    CKD (chronic kidney disease) 2013    ESRD (end stage renal disease) 07    IgA Nephropathy    Gout     History of HCV, s/p successful Rx w/ SVR - 2016     Liver biopsy 12: Stage 1 fibrosis Completed 24 weeks Harvoni + RBV w/ SVR    Hypertension, renal     IgA nephropathy     Immunosuppressive management encounter following kidney transplant     Kidney transplant status, living related donor 2009    Obesity      Type 2 diabetes mellitus      Past Surgical History:   Procedure Laterality Date    CARDIAC SURGERY      KIDNEY TRANSPLANT      LIVER TRANSPLANT      TUBAL LIGATION       Current Outpatient Medications   Medication Sig    allopurinol (ZYLOPRIM) 300 MG tablet Take 300 mg by mouth every morning.    cyanocobalamin (VITAMIN B-12) 100 MCG tablet Take 100 mcg by mouth after lunch.     docusate sodium (COLACE) 50 MG capsule Take 1 capsule (50 mg total) by mouth 2 (two) times daily.    escitalopram oxalate (LEXAPRO) 5 mg/5 mL Soln Take 10 mg by mouth once daily.    hydrALAZINE (APRESOLINE) 25 MG tablet Take 25 mg by mouth 2 (two) times daily. Has been taking twice a day    insulin lispro (HUMALOG KWIKPEN) 100 unit/mL InPn Inject 5 Units as directed Three times a day before meals. And has a Sliding Scale    irbesartan (AVAPRO) 150 MG tablet Take 150 mg by mouth once daily.    levothyroxine (SYNTHROID) 75 MCG tablet Take 75 mcg by mouth before breakfast.    magnesium oxide (MAG-OX) 250 mg Tab Take 250 mg by mouth after lunch.    metFORMIN (GLUCOPHAGE-XR) 500 MG 24 hr tablet Take 500 mg by mouth 2 (two) times daily.    multivitamin (MULTIPLE VITAMIN) per tablet Take 1 tablet by mouth after lunch.     mycophenolate (CELLCEPT) 250 mg Cap TAKE 3 CAPSULES TWICE DAILY    PREDNISONE ORAL Take 4 mg by mouth every morning.    tacrolimus (PROGRAF) 0.5 MG Cap Take 3 capsules (1.5 mg total) by mouth once daily. (Patient taking differently: Take 0.5 mg by mouth 2 (two) times daily. )    temazepam (RESTORIL) 15 mg Cap Take 15 mg by mouth daily as needed.    TOUJEO SOLOSTAR U-300 INSULIN 300 unit/mL (1.5 mL) InPn pen 36 Units by abdominal subcutaneous route once daily.    alendronate (FOSAMAX) 70 MG tablet Take 70 mg by mouth every 7 days. Takes on Saturdays    hydrocodone-acetaminophen 5-325mg (NORCO) 5-325 mg per tablet Take 1 tablet by mouth every 4 (four) hours as needed. (Patient not taking: Reported on  10/7/2019)    LANTUS SOLOSTAR 100 unit/mL (3 mL) InPn pen INJECT 12 UNITS EACH MORNING. (Patient not taking: Reported on 10/7/2019)    tacrolimus (PROGRAF) 0.5 MG Cap TAKE 3 CAPSULES ONCE A DAY. (Patient not taking: Reported on 10/7/2019)     No current facility-administered medications for this visit.      Review of Systems   Constitutional: Negative for activity change, appetite change, chills, fatigue, fever and unexpected weight change.   HENT: Negative for ear pain, hearing loss, nosebleeds, sore throat and trouble swallowing.    Eyes: Negative for redness and visual disturbance.   Respiratory: Negative for cough, chest tightness, shortness of breath and wheezing.    Cardiovascular: Negative for chest pain and palpitations.   Gastrointestinal: Negative for abdominal distention, abdominal pain, blood in stool, constipation, diarrhea, nausea and vomiting.   Genitourinary: Negative for difficulty urinating, dysuria, frequency, hematuria and urgency.   Musculoskeletal: Negative for arthralgias, back pain, gait problem, joint swelling and myalgias.   Skin: Negative for rash.   Neurological: Negative for tremors, seizures, speech difficulty, weakness and headaches.   Hematological: Negative for adenopathy.   Psychiatric/Behavioral: Negative for confusion, decreased concentration and sleep disturbance. The patient is not nervous/anxious.          Objective:      Physical Exam   Constitutional: She appears well-developed and well-nourished. No distress.   HENT:   Head: Normocephalic.   Eyes: Pupils are equal, round, and reactive to light.   Neck: No JVD present. No tracheal deviation present. No thyromegaly present.   Cardiovascular: Normal rate, regular rhythm and normal heart sounds.   No murmur heard.  Pulmonary/Chest: Effort normal and breath sounds normal. No stridor.   Abdominal: Soft.   Lymphadenopathy:        Head (right side): No submental, no submandibular, no tonsillar, no preauricular, no posterior  auricular and no occipital adenopathy present.        Head (left side): No submental, no submandibular, no tonsillar, no preauricular, no posterior auricular and no occipital adenopathy present.     She has no cervical adenopathy.     She has no axillary adenopathy.   Neurological: She is alert. She has normal strength. She is not disoriented. No cranial nerve deficit or sensory deficit.   Skin: Skin is intact. No cyanosis.       Assessment:       1. Liver replaced by transplant for HCV 7/2003    2. Prophylactic immunosuppression for kidney transplant    3. Kidney transplant status, living related donor 8/19/2009    4. History of HCV, s/p successful Rx w/ SVR24 - 5/2016        Plan:   1. No change in immunosuppression- Tac/MMF 750mg BID and predn 4mg daily  2. Ongoing kidney Tx follow up in [place]- BP monitored by nephrologist  3. Labs every 3 months  4. Has ongoing dermatology surveillance  5. Clinic in about 1 year      Functional Status: 100% - Normal, no complaints, no evidence of disease  Physical Capacity: No Limitations

## 2019-10-07 NOTE — LETTER
October 7, 2019        Grayson Hawthorne  [address]  [facility] [place]  [place] [address]  Phone: [phone]  Fax: [phone]     Gianni Alonso  [address]   [place] [address]  Phone: [phone]  Fax: [phone]             Dk [facility] - Liver Transplant  [address]  [facility] [address]  Phone: [phone]   Patient: Елена Atkinson   MR Number: [id number]   YOB: 1955   Date of Visit: 10/7/2019       Dear Dr. Grayson Hawthorne, Gianni Alonso    Thank you for referring Елена Atkinson to me for evaluation. Attached you will find relevant portions of my assessment and plan of care.    If you have questions, please do not hesitate to call me. I look forward to following Елена Atkinson along with you.    Sincerely,    Jeremiah Contreras MD    Enclosure    If you would like to receive this communication electronically, please contact [email or web address] or [phone] to request [employer] access.    [employer] is a tool which provides read-only access to select patient information with whom you have a relationship. Its easy to use and provides real time access to review your patients record including encounter summaries, notes, results, and demographic information.    If you feel you have received this communication in error or would no longer like to receive these types of communications, please e-mail [email or web address]

## 2019-10-10 RX ORDER — TACROLIMUS 0.5 MG/1
0.5 CAPSULE ORAL EVERY 12 HOURS
Qty: 60 CAPSULE | Refills: 11 | Status: SHIPPED | OUTPATIENT
Start: 2019-10-10 | End: 2020-10-14

## 2019-10-10 RX ORDER — MYCOPHENOLATE MOFETIL 250 MG/1
750 CAPSULE ORAL 2 TIMES DAILY
Qty: 180 CAPSULE | Refills: 11 | Status: SHIPPED | OUTPATIENT
Start: 2019-10-10 | End: 2020-10-14

## 2019-10-10 NOTE — TELEPHONE ENCOUNTER
----- Message from Minerva Cochran RN sent at 10/10/2019 11:25 AM CDT -----      ----- Message -----  From: Sammy Guerra MD  Sent: 10/10/2019  10:39 AM CDT  To: [facility] Post-Kidney Transplant Clinical    She had a kidney transplant here no sure we are following this patient here.   I receive a request to fill tacrolimus Are we refilling this ir her nephrologist at [facility]. please let me know. Thanks    Sammy

## 2019-10-11 RX ORDER — TACROLIMUS 0.5 MG/1
CAPSULE ORAL
Qty: 90 CAPSULE | Refills: 0 | OUTPATIENT
Start: 2019-10-11

## 2019-12-23 ENCOUNTER — TELEPHONE (OUTPATIENT)
Dept: TRANSPLANT | Facility: CLINIC | Age: 64
End: 2019-12-23

## 2019-12-23 NOTE — TELEPHONE ENCOUNTER
----- Message from Yadi Ortiz sent at 10/28/2019  9:54 AM CDT -----      ----- Message -----  From: Carine Beltre MA  Sent: 10/14/2019  12:51 PM CDT  To: Ebonie Bell RN

## 2019-12-26 ENCOUNTER — TELEPHONE (OUTPATIENT)
Dept: TRANSPLANT | Facility: CLINIC | Age: 64
End: 2019-12-26

## 2020-01-07 ENCOUNTER — TELEPHONE (OUTPATIENT)
Dept: TRANSPLANT | Facility: CLINIC | Age: 65
End: 2020-01-07

## 2020-01-10 LAB
EXT ALBUMIN: 4.1
EXT ALKALINE PHOSPHATASE: 71
EXT ALT: 15
EXT AST: 19
EXT BASOPHIL%: 1
EXT BILIRUBIN TOTAL: 0.4
EXT BUN: 19
EXT CALCIUM: 9.4
EXT CHLORIDE: 110
EXT CO2: 26
EXT CREATININE: 0.58 MG/DL
EXT EOSINOPHIL%: 4.1
EXT GLUCOSE: 142
EXT HEMATOCRIT: 41.6
EXT HEMOGLOBIN: 13.5
EXT LYMPH%: 14.2
EXT MONOCYTES%: 8
EXT PLATELETS: 214
EXT POTASSIUM: 4
EXT PROTEIN TOTAL: 5.6
EXT SEGS%: 71.1
EXT SODIUM: 145 MMOL/L
EXT TACROLIMUS LVL: 5.3
EXT WBC: 8.1

## 2020-01-15 ENCOUNTER — TELEPHONE (OUTPATIENT)
Dept: TRANSPLANT | Facility: CLINIC | Age: 65
End: 2020-01-15

## 2020-03-02 NOTE — LETTER
January 15, 2020    Елена Atkinson  [address] [facility] [place] [address]          Dear Елена Atkinson:  MRN: [id number]    This is a follow up to your recent labs, your lab results were stable.  There are no medicine changes.  Please have your labs drawn again on 3/30/20.      If you cannot have your labs drawn on the scheduled date, it is your responsibility to call the transplant department to reschedule.  To reschedule or make an appointment, please as to speak to or leave a message for my assistant, Carine Mesa or Elizabeth, at [phone].  When leaving a message for Carine Mesa Angela or myself, we ask that you leave a brief message regarding your request.    Sincerely,        Your Liver Transplant Coordinator    [facility]  [facility] [place] [address]  [phone]        [facility]

## 2020-03-13 LAB
EXT ALBUMIN: 3.6
EXT ALKALINE PHOSPHATASE: 60
EXT ALT: 19
EXT AST: 24
EXT BILIRUBIN TOTAL: 0.5
EXT BUN: 11
EXT CALCIUM: 8.8
EXT CHLORIDE: 109
EXT CO2: 27
EXT CREATININE: 0.5 MG/DL
EXT EOSINOPHIL%: 6.1
EXT GFR MDRD NON AF AMER: >90
EXT GLUCOSE: 146
EXT HEMATOCRIT: 39.3
EXT HEMOGLOBIN: 12.5
EXT LYMPH%: 12.4
EXT MONOCYTES%: 7.8
EXT PLATELETS: 179
EXT POTASSIUM: 3.6
EXT PROTEIN TOTAL: 5
EXT SEGS%: 72
EXT SODIUM: 144 MMOL/L
EXT TACROLIMUS LVL: 5.6
EXT WBC: 6.1

## 2020-03-16 ENCOUNTER — TELEPHONE (OUTPATIENT)
Dept: TRANSPLANT | Facility: CLINIC | Age: 65
End: 2020-03-16

## 2020-06-18 ENCOUNTER — TELEPHONE (OUTPATIENT)
Dept: TRANSPLANT | Facility: CLINIC | Age: 65
End: 2020-06-18

## 2020-06-18 NOTE — TELEPHONE ENCOUNTER
Requested lab results (due on 6/15/20) patient's local lab reported patient has had no recent labs. Missed Lab Letter sent to patient.

## 2020-06-18 NOTE — TELEPHONE ENCOUNTER
----- Message from Azalia Mason sent at 6/18/2020  8:30 AM CDT -----  Regarding: RE: [facility]: Kerri  Good morning Jairo    Ms. Atkinson is a post transplant patient. I'm forwarding this message to their pool.    Thanks and have a great day  Azalia  ----- Message -----  From: Jairo Ricketts  Sent: 6/18/2020   8:18 AM CDT  To: [facility] Pre-Liver Transplant Clinical  Subject: [facility]: Kerri                   Pt has had no recent labs.    [phone](o)

## 2020-06-26 ENCOUNTER — TELEPHONE (OUTPATIENT)
Dept: TRANSPLANT | Facility: CLINIC | Age: 65
End: 2020-06-26

## 2020-06-26 LAB
EXT ALBUMIN: 3.3
EXT ALKALINE PHOSPHATASE: 62
EXT ALT: 19
EXT AST: 24
EXT BILIRUBIN TOTAL: 0.4
EXT BUN: 10
EXT CALCIUM: 8.6
EXT CHLORIDE: 108
EXT CO2: 29
EXT CREATININE: 0.52 MG/DL
EXT EOSINOPHIL%: 5.1
EXT GFR MDRD NON AF AMER: >90
EXT GLUCOSE: 148
EXT HEMATOCRIT: 40.7
EXT HEMOGLOBIN: 13.3
EXT LYMPH%: 12.3
EXT MONOCYTES%: 8.1
EXT PLATELETS: 158
EXT POTASSIUM: 4
EXT PROTEIN TOTAL: 5
EXT SEGS%: 73.1
EXT SODIUM: 145 MMOL/L
EXT TACROLIMUS LVL: 6.1
EXT WBC: 6.3

## 2020-12-22 ENCOUNTER — TELEPHONE (OUTPATIENT)
Dept: TRANSPLANT | Facility: CLINIC | Age: 65
End: 2020-12-22

## 2021-01-08 ENCOUNTER — PATIENT MESSAGE (OUTPATIENT)
Dept: TRANSPLANT | Facility: CLINIC | Age: 66
End: 2021-01-08

## 2021-02-03 ENCOUNTER — PATIENT MESSAGE (OUTPATIENT)
Dept: FAMILY MEDICINE | Facility: CLINIC | Age: 66
End: 2021-02-03

## 2021-05-03 ENCOUNTER — TELEPHONE (OUTPATIENT)
Dept: TRANSPLANT | Facility: CLINIC | Age: 66
End: 2021-05-03

## 2021-05-04 ENCOUNTER — PATIENT MESSAGE (OUTPATIENT)
Dept: FAMILY MEDICINE | Facility: CLINIC | Age: 66
End: 2021-05-04

## 2021-05-04 ENCOUNTER — TELEPHONE (OUTPATIENT)
Dept: TRANSPLANT | Facility: CLINIC | Age: 66
End: 2021-05-04

## 2021-05-04 ENCOUNTER — TELEPHONE (OUTPATIENT)
Dept: FAMILY MEDICINE | Facility: CLINIC | Age: 66
End: 2021-05-04

## 2021-05-04 ENCOUNTER — PATIENT MESSAGE (OUTPATIENT)
Dept: TRANSPLANT | Facility: CLINIC | Age: 66
End: 2021-05-04

## 2021-05-04 DIAGNOSIS — I63.9 CEREBROVASCULAR ACCIDENT (CVA), UNSPECIFIED MECHANISM: Primary | ICD-10-CM

## 2021-05-05 LAB
EXT ALBUMIN: 3.7
EXT ALKALINE PHOSPHATASE: 73
EXT ALT: 20
EXT AST: 27
EXT BILIRUBIN TOTAL: 0.6
EXT BUN: 12
EXT CALCIUM: 9.1
EXT CHLORIDE: 105
EXT CO2: 26
EXT CREATININE: 0.5 MG/DL
EXT EOSINOPHIL%: 2
EXT GFR MDRD NON AF AMER: >90
EXT GLUCOSE: 180
EXT HEMATOCRIT: 42.9
EXT HEMOGLOBIN: 14
EXT LYMPH%: 6.7
EXT MONOCYTES%: 4.1
EXT PLATELETS: 213
EXT POTASSIUM: 4
EXT PROTEIN TOTAL: 5.1
EXT SEGS%: 86.3
EXT SODIUM: 140 MMOL/L
EXT TACROLIMUS LVL: 8.9
EXT WBC: 11.8

## 2021-05-07 ENCOUNTER — TELEPHONE (OUTPATIENT)
Dept: TRANSPLANT | Facility: CLINIC | Age: 66
End: 2021-05-07

## 2021-05-12 ENCOUNTER — PATIENT MESSAGE (OUTPATIENT)
Dept: RESEARCH | Facility: HOSPITAL | Age: 66
End: 2021-05-12

## 2021-05-17 ENCOUNTER — OFFICE VISIT (OUTPATIENT)
Dept: FAMILY MEDICINE | Facility: CLINIC | Age: 66
End: 2021-05-17
Payer: MEDICARE

## 2021-05-17 VITALS
HEIGHT: 62 IN | WEIGHT: 168 LBS | OXYGEN SATURATION: 97 % | SYSTOLIC BLOOD PRESSURE: 122 MMHG | BODY MASS INDEX: 30.91 KG/M2 | DIASTOLIC BLOOD PRESSURE: 80 MMHG | TEMPERATURE: 97 F | HEART RATE: 89 BPM

## 2021-05-17 DIAGNOSIS — Z94.0 KIDNEY TRANSPLANT STATUS, LIVING RELATED DONOR: Chronic | ICD-10-CM

## 2021-05-17 DIAGNOSIS — I12.9 HYPERTENSION, RENAL: Chronic | ICD-10-CM

## 2021-05-17 DIAGNOSIS — E11.9 TYPE 2 DIABETES MELLITUS WITHOUT COMPLICATION, WITH LONG-TERM CURRENT USE OF INSULIN: ICD-10-CM

## 2021-05-17 DIAGNOSIS — I63.9 CEREBROVASCULAR ACCIDENT (CVA), UNSPECIFIED MECHANISM: Primary | ICD-10-CM

## 2021-05-17 DIAGNOSIS — Z79.4 TYPE 2 DIABETES MELLITUS WITHOUT COMPLICATION, WITH LONG-TERM CURRENT USE OF INSULIN: ICD-10-CM

## 2021-05-17 PROCEDURE — 99214 OFFICE O/P EST MOD 30 MIN: CPT | Mod: S$GLB,,, | Performed by: FAMILY MEDICINE

## 2021-05-17 PROCEDURE — 99214 PR OFFICE/OUTPT VISIT, EST, LEVL IV, 30-39 MIN: ICD-10-PCS | Mod: S$GLB,,, | Performed by: FAMILY MEDICINE

## 2021-07-06 ENCOUNTER — EXTERNAL HOME HEALTH (OUTPATIENT)
Dept: HOME HEALTH SERVICES | Facility: HOSPITAL | Age: 66
End: 2021-07-06
Payer: MEDICARE

## 2021-07-14 RX ORDER — TRAMADOL HYDROCHLORIDE 50 MG/1
50 TABLET ORAL EVERY 6 HOURS PRN
Qty: 16 TABLET | Refills: 2 | Status: SHIPPED | OUTPATIENT
Start: 2021-07-14 | End: 2022-04-04

## 2021-08-04 ENCOUNTER — TELEPHONE (OUTPATIENT)
Dept: TRANSPLANT | Facility: CLINIC | Age: 66
End: 2021-08-04

## 2021-08-04 LAB
EXT ALBUMIN: 3.5
EXT ALKALINE PHOSPHATASE: 77
EXT ALT: 52
EXT AST: 27
EXT BILIRUBIN TOTAL: 0.7
EXT BUN: 18
EXT CALCIUM: 9.4
EXT CHLORIDE: 108
EXT CO2: 26
EXT CREATININE: 0.5 MG/DL
EXT EOSINOPHIL%: 5.4
EXT GFR MDRD NON AF AMER: >90
EXT GLUCOSE: 152
EXT HEMATOCRIT: 37.4
EXT HEMOGLOBIN: 12.1
EXT LYMPH%: 16.3
EXT MONOCYTES%: 7.6
EXT PLATELETS: 171
EXT POTASSIUM: 4
EXT PROTEIN TOTAL: 4.5
EXT SEGS%: 69.3
EXT SODIUM: 142 MMOL/L
EXT TACROLIMUS LVL: 13.2
EXT WBC: 6.5

## 2021-08-06 ENCOUNTER — TELEPHONE (OUTPATIENT)
Dept: TRANSPLANT | Facility: CLINIC | Age: 66
End: 2021-08-06

## 2021-08-06 ENCOUNTER — PATIENT MESSAGE (OUTPATIENT)
Dept: TRANSPLANT | Facility: CLINIC | Age: 66
End: 2021-08-06

## 2021-08-12 RX ORDER — MYCOPHENOLATE MOFETIL 250 MG/1
CAPSULE ORAL
Qty: 180 CAPSULE | Refills: 0 | Status: SHIPPED | OUTPATIENT
Start: 2021-08-12 | End: 2021-09-09

## 2021-08-12 RX ORDER — TACROLIMUS 0.5 MG/1
CAPSULE ORAL
Qty: 60 CAPSULE | Refills: 0 | Status: SHIPPED | OUTPATIENT
Start: 2021-08-12 | End: 2022-01-03 | Stop reason: SDUPTHER

## 2021-08-23 ENCOUNTER — PATIENT MESSAGE (OUTPATIENT)
Dept: TRANSPLANT | Facility: CLINIC | Age: 66
End: 2021-08-23

## 2021-08-24 ENCOUNTER — TELEPHONE (OUTPATIENT)
Dept: TRANSPLANT | Facility: CLINIC | Age: 66
End: 2021-08-24

## 2021-09-13 ENCOUNTER — TELEPHONE (OUTPATIENT)
Dept: TRANSPLANT | Facility: CLINIC | Age: 66
End: 2021-09-13

## 2021-09-15 ENCOUNTER — PATIENT MESSAGE (OUTPATIENT)
Dept: TRANSPLANT | Facility: CLINIC | Age: 66
End: 2021-09-15

## 2021-09-20 LAB
EXT ALBUMIN: 3.6
EXT ALKALINE PHOSPHATASE: 74
EXT ALT: 23
EXT AST: 26
EXT BILIRUBIN TOTAL: 0.5
EXT BUN: 26
EXT CALCIUM: 9.3
EXT CHLORIDE: 110
EXT CO2: 26
EXT CREATININE: 0.58 MG/DL
EXT GFR MDRD NON AF AMER: >90
EXT GLUCOSE: 150
EXT HEMATOCRIT: 36.9
EXT HEMOGLOBIN A1C: 7 %
EXT HEMOGLOBIN: 11.6
EXT MAGNESIUM: 1.4
EXT PLATELETS: 174
EXT POTASSIUM: 4.2
EXT PROT/CREAT RATIO UR: 2.3
EXT PROTEIN TOTAL: 5.2
EXT PTH, INTACT: 48
EXT SODIUM: 143 MMOL/L
EXT TACROLIMUS LVL: 6.2
EXT VIT D 25 HYDROXY: 26
EXT WBC: 6.3
TSH SERPL DL<=0.005 MIU/L-ACNC: 1.35 UIU/ML (ref 0.41–5.9)

## 2021-09-21 ENCOUNTER — TELEPHONE (OUTPATIENT)
Dept: TRANSPLANT | Facility: CLINIC | Age: 66
End: 2021-09-21

## 2021-12-26 ENCOUNTER — PATIENT MESSAGE (OUTPATIENT)
Dept: TRANSPLANT | Facility: CLINIC | Age: 66
End: 2021-12-26
Payer: MEDICARE

## 2022-01-03 ENCOUNTER — LAB VISIT (OUTPATIENT)
Dept: LAB | Facility: CLINIC | Age: 67
End: 2022-01-03
Payer: MEDICARE

## 2022-01-03 ENCOUNTER — PATIENT MESSAGE (OUTPATIENT)
Dept: FAMILY MEDICINE | Facility: CLINIC | Age: 67
End: 2022-01-03

## 2022-01-03 ENCOUNTER — OFFICE VISIT (OUTPATIENT)
Dept: FAMILY MEDICINE | Facility: CLINIC | Age: 67
End: 2022-01-03
Payer: MEDICARE

## 2022-01-03 VITALS
WEIGHT: 176 LBS | BODY MASS INDEX: 31.18 KG/M2 | TEMPERATURE: 99 F | HEART RATE: 100 BPM | HEIGHT: 63 IN | SYSTOLIC BLOOD PRESSURE: 128 MMHG | DIASTOLIC BLOOD PRESSURE: 62 MMHG | RESPIRATION RATE: 18 BRPM | OXYGEN SATURATION: 98 %

## 2022-01-03 DIAGNOSIS — Z79.4 TYPE 2 DIABETES MELLITUS WITHOUT COMPLICATION, WITH LONG-TERM CURRENT USE OF INSULIN: Primary | ICD-10-CM

## 2022-01-03 DIAGNOSIS — Z94.4 LIVER REPLACED BY TRANSPLANT: ICD-10-CM

## 2022-01-03 DIAGNOSIS — E11.9 TYPE 2 DIABETES MELLITUS WITHOUT COMPLICATION, WITH LONG-TERM CURRENT USE OF INSULIN: Primary | ICD-10-CM

## 2022-01-03 DIAGNOSIS — E83.42 HYPOMAGNESEMIA: ICD-10-CM

## 2022-01-03 DIAGNOSIS — I12.9 HYPERTENSION, RENAL: Chronic | ICD-10-CM

## 2022-01-03 DIAGNOSIS — Z94.0 KIDNEY TRANSPLANT STATUS, LIVING RELATED DONOR: Chronic | ICD-10-CM

## 2022-01-03 LAB — MAGNESIUM SERPL-MCNC: 1.3 MG/DL (ref 1.6–2.6)

## 2022-01-03 PROCEDURE — 99214 PR OFFICE/OUTPT VISIT, EST, LEVL IV, 30-39 MIN: ICD-10-PCS | Mod: S$GLB,,, | Performed by: FAMILY MEDICINE

## 2022-01-03 PROCEDURE — 36415 PR COLLECTION VENOUS BLOOD,VENIPUNCTURE: ICD-10-PCS | Mod: ,,, | Performed by: FAMILY MEDICINE

## 2022-01-03 PROCEDURE — 99214 OFFICE O/P EST MOD 30 MIN: CPT | Mod: S$GLB,,, | Performed by: FAMILY MEDICINE

## 2022-01-03 PROCEDURE — 36415 COLL VENOUS BLD VENIPUNCTURE: CPT | Mod: ,,, | Performed by: FAMILY MEDICINE

## 2022-01-03 PROCEDURE — 83735 ASSAY OF MAGNESIUM: CPT | Performed by: FAMILY MEDICINE

## 2022-01-03 RX ORDER — LOSARTAN POTASSIUM 50 MG/1
TABLET ORAL
COMMUNITY
Start: 2021-06-24 | End: 2022-09-20

## 2022-01-03 RX ORDER — METFORMIN HYDROCHLORIDE EXTENDED-RELEASE TABLETS 500 MG/1
TABLET, FILM COATED, EXTENDED RELEASE ORAL
COMMUNITY
Start: 2021-01-26 | End: 2022-01-03 | Stop reason: SDUPTHER

## 2022-01-03 RX ORDER — ATORVASTATIN CALCIUM 20 MG/1
20 TABLET, FILM COATED ORAL
COMMUNITY
End: 2023-01-03 | Stop reason: SDUPTHER

## 2022-01-03 NOTE — PATIENT INSTRUCTIONS
Focus on high magnesium foods and new supplement - greens, nuts, beans    Work best as you can to reduce sugar through diet    Check magnesium today    Follow up 3-4 months

## 2022-01-03 NOTE — PROGRESS NOTES
"  Chief Complaint   Patient presents with    Follow-up     Low electrolites        HPI:  Established patient of Dr. alexander's with complex medical history:  Insulin dependent diabetic, with history of kidney transplant and liver transplant.  Liver transplant was in distant past due to Hep C.      Blood Pressure at goal on medication, with patient reporting prescription compliance    Hyperlipidemia stable on appropriate intensity statin therapy    TSH historically at goal on supplementation  Past CVA as well    Presented to [facility] ED on 12/28 with notable electrolyte derangements in setting of elevated blood glucose (initial check was around 400).  Repeat labs show improvement in electrolytes, but needs repeat check of low magnesium.    Additionally, is reporting R knee pain over the past 2 months since fall.    ROS: no acute issues to report    Vitals:    01/03/22 1438   BP: 128/62   BP Location: Left arm   Patient Position: Sitting   Pulse: 100   Resp: 18   Temp: 98.6 °F (37 °C)   SpO2: 98%   Weight: 79.8 kg (176 lb)   Height: 5' 3" (1.6 m)      Body mass index is 31.18 kg/m².      General:  AOx3, well nourished and developed in no acute distress  Eyes:  PERRLA, EOMI, vision intact grossly  ENT:  normal hearing, moist oral mucosa  Neck:  trachea midline with no masses or thyromegaly  Heart:  RRR, no murmurs.  No edema noted, extremities warm and well perfused  Lungs:  clear to auscultation bilaterally with symmetric chest movement  Abdomen:  Soft, nontender, nondistended.  Normal bowel sounds  Musculoskeletal:  Normal gait.  Normal posture.  Normal muscular development with no joint swelling.  Neurological:  CN II-XII grossly intact. Symmetric strength and sensation  Psych:  Normal mood and affect.  Able to demonstrate good judgement and personal insight.      Assessment/Plan:    Type 2 diabetes mellitus without complication, with long-term current use of insulin    Hypertension, renal    Liver replaced by " transplant for HCV 7/2003    Kidney transplant status, living related donor 8/19/2009    Hypomagnesemia       1.  Last A1C 7.0%.  Advised on continued dietary therapy to avoid electrolyte abnormalities  2.  At goal on medication  3. Liver and renal function stable  4.  Check magnesium level

## 2022-02-08 LAB
EXT ALBUMIN: 3.5
EXT ALKALINE PHOSPHATASE: 67
EXT ALT: 20
EXT AST: 24
EXT BASOPHIL%: NORMAL
EXT BILIRUBIN TOTAL: 0.5
EXT BUN: 13
EXT CALCIUM: 9.3
EXT CHLORIDE: 110
EXT CO2: 26
EXT CREATININE: 0.55 MG/DL
EXT EOSINOPHIL%: 6.6
EXT GFR MDRD NON AF AMER: >90
EXT GLUCOSE: 173
EXT HEMATOCRIT: 37.8
EXT HEMOGLOBIN: 12.2
EXT LYMPH%: 16.9
EXT MONOCYTES%: 8
EXT PLATELETS: 187
EXT POTASSIUM: 3.9
EXT PROTEIN TOTAL: 5.1
EXT SEGS%: 67.1
EXT SODIUM: 142 MMOL/L
EXT TACROLIMUS LVL: 4.8
EXT WBC: 6.6

## 2022-02-10 ENCOUNTER — TELEPHONE (OUTPATIENT)
Dept: TRANSPLANT | Facility: CLINIC | Age: 67
End: 2022-02-10
Payer: MEDICARE

## 2022-02-10 NOTE — TELEPHONE ENCOUNTER
Labs are stable. No changes.----- Message from Jeremiah Contreras MD sent at 2/8/2022  3:47 PM CST -----  Results reviewed

## 2022-02-15 ENCOUNTER — TELEPHONE (OUTPATIENT)
Dept: TRANSPLANT | Facility: CLINIC | Age: 67
End: 2022-02-15
Payer: MEDICARE

## 2022-02-15 NOTE — LETTER
February 15, 2022    Елена Atkinson  [address] [facility] [place] [address]          Dear Елена Atkinson:  MRN: [id number]    This is a follow up to your recent labs, your lab results were stable.  There are no medicine changes.  Please have your labs drawn again on 3/28/22  .      If you cannot have your labs drawn on the scheduled date, it is your responsibility to call the transplant department to reschedule.  Please call [phone] and ask to speak to Elizabeth Ugalde Specialist for all scheduling requests.     Sincerely,        Your Liver Transplant Coordinator    [facility]  [address] [place] [address]  [phone]

## 2022-03-28 ENCOUNTER — TELEPHONE (OUTPATIENT)
Dept: FAMILY MEDICINE | Facility: CLINIC | Age: 67
End: 2022-03-28
Payer: MEDICARE

## 2022-03-28 DIAGNOSIS — Z12.31 ENCOUNTER FOR SCREENING MAMMOGRAM FOR BREAST CANCER: Primary | ICD-10-CM

## 2022-03-29 ENCOUNTER — TELEPHONE (OUTPATIENT)
Dept: FAMILY MEDICINE | Facility: CLINIC | Age: 67
End: 2022-03-29
Payer: MEDICARE

## 2022-03-29 NOTE — TELEPHONE ENCOUNTER
Called pt to schedule mammogram no answer Lt message to call back and ask for Crystal [phone]

## 2022-03-31 ENCOUNTER — PATIENT MESSAGE (OUTPATIENT)
Dept: FAMILY MEDICINE | Facility: CLINIC | Age: 67
End: 2022-03-31
Payer: MEDICARE

## 2022-04-01 ENCOUNTER — TELEPHONE (OUTPATIENT)
Dept: TRANSPLANT | Facility: CLINIC | Age: 67
End: 2022-04-01
Payer: MEDICARE

## 2022-04-01 NOTE — TELEPHONE ENCOUNTER
Returned call let voicemail to let her know someone was calling to schedule her mamogram  ----- Message from Maci Alciia MA sent at 4/1/2022  7:40 AM CDT -----    ----- Message -----  From: Shelly Dowell MA  Sent: 3/31/2022   4:52 PM CDT  To: Maci Alicia MA      ----- Message -----  From: Yuko Hubbard  Sent: 3/31/2022   4:49 PM CDT  To: Ben Daniels Staff         Type: Patient Returning Call    Who Called: Pt  Who Left Message for Patient: NA  Does the patient know what this is regarding?: Follow up visit  Would the patient rather a call back or a response via [facility]? Call  Best Call Back Number: [phone]  Additional Information: Please assist, thank you!

## 2022-04-04 ENCOUNTER — TELEPHONE (OUTPATIENT)
Dept: TRANSPLANT | Facility: CLINIC | Age: 67
End: 2022-04-04
Payer: MEDICARE

## 2022-04-04 ENCOUNTER — OFFICE VISIT (OUTPATIENT)
Dept: FAMILY MEDICINE | Facility: CLINIC | Age: 67
End: 2022-04-04
Payer: MEDICARE

## 2022-04-04 ENCOUNTER — LAB VISIT (OUTPATIENT)
Dept: LAB | Facility: CLINIC | Age: 67
End: 2022-04-04
Payer: MEDICARE

## 2022-04-04 VITALS
WEIGHT: 177 LBS | SYSTOLIC BLOOD PRESSURE: 136 MMHG | RESPIRATION RATE: 18 BRPM | DIASTOLIC BLOOD PRESSURE: 58 MMHG | BODY MASS INDEX: 31.36 KG/M2 | OXYGEN SATURATION: 97 % | TEMPERATURE: 98 F | HEART RATE: 93 BPM | HEIGHT: 63 IN

## 2022-04-04 DIAGNOSIS — E11.9 TYPE 2 DIABETES MELLITUS WITHOUT COMPLICATION, WITH LONG-TERM CURRENT USE OF INSULIN: ICD-10-CM

## 2022-04-04 DIAGNOSIS — E83.42 HYPOMAGNESEMIA: ICD-10-CM

## 2022-04-04 DIAGNOSIS — Z79.4 TYPE 2 DIABETES MELLITUS WITHOUT COMPLICATION, WITH LONG-TERM CURRENT USE OF INSULIN: ICD-10-CM

## 2022-04-04 DIAGNOSIS — E11.9 TYPE 2 DIABETES MELLITUS WITHOUT COMPLICATION, WITH LONG-TERM CURRENT USE OF INSULIN: Primary | ICD-10-CM

## 2022-04-04 DIAGNOSIS — I12.9 HYPERTENSION, RENAL: ICD-10-CM

## 2022-04-04 DIAGNOSIS — Z94.4 LIVER REPLACED BY TRANSPLANT: ICD-10-CM

## 2022-04-04 DIAGNOSIS — Z79.4 TYPE 2 DIABETES MELLITUS WITHOUT COMPLICATION, WITH LONG-TERM CURRENT USE OF INSULIN: Primary | ICD-10-CM

## 2022-04-04 PROCEDURE — 36415 PR COLLECTION VENOUS BLOOD,VENIPUNCTURE: ICD-10-PCS | Mod: ,,, | Performed by: FAMILY MEDICINE

## 2022-04-04 PROCEDURE — 83735 ASSAY OF MAGNESIUM: CPT | Performed by: FAMILY MEDICINE

## 2022-04-04 PROCEDURE — 83036 HEMOGLOBIN GLYCOSYLATED A1C: CPT | Performed by: FAMILY MEDICINE

## 2022-04-04 PROCEDURE — 36415 COLL VENOUS BLD VENIPUNCTURE: CPT | Mod: ,,, | Performed by: FAMILY MEDICINE

## 2022-04-04 PROCEDURE — 80061 LIPID PANEL: CPT | Performed by: FAMILY MEDICINE

## 2022-04-04 PROCEDURE — 99213 OFFICE O/P EST LOW 20 MIN: CPT | Mod: S$GLB,,, | Performed by: FAMILY MEDICINE

## 2022-04-04 PROCEDURE — 80053 COMPREHEN METABOLIC PANEL: CPT | Performed by: FAMILY MEDICINE

## 2022-04-04 PROCEDURE — 85025 COMPLETE CBC W/AUTO DIFF WBC: CPT | Performed by: FAMILY MEDICINE

## 2022-04-04 PROCEDURE — 99213 PR OFFICE/OUTPT VISIT, EST, LEVL III, 20-29 MIN: ICD-10-PCS | Mod: S$GLB,,, | Performed by: FAMILY MEDICINE

## 2022-04-04 NOTE — PROGRESS NOTES
Subjective:       Patient ID: Елена Atkinson is a 67 y.o. female.    Chief Complaint: Follow-up (3 month) and Medication Refill      Review of patient's allergies indicates:   Allergen Reactions    No known drug allergies       Follow-up  This is a recurrent problem. The current episode started more than 1 month ago. The problem occurs intermittently. The problem has been unchanged. Associated symptoms include arthralgias and fatigue. Pertinent negatives include no abdominal pain, chest pain, chills, fever, headaches, joint swelling, nausea, rash, sore throat, vertigo or weakness.   Medication Refill  Associated symptoms include arthralgias and fatigue. Pertinent negatives include no abdominal pain, chest pain, chills, fever, headaches, joint swelling, nausea, rash, sore throat, vertigo or weakness.     Review of Systems   Constitutional: Positive for fatigue. Negative for chills, fever and unexpected weight change.   HENT: Negative for ear pain, rhinorrhea, sinus pressure/congestion, sneezing and sore throat.    Eyes: Negative for photophobia and pain.   Respiratory: Negative for chest tightness, shortness of breath and wheezing.    Cardiovascular: Negative for chest pain.   Gastrointestinal: Negative for abdominal distention, abdominal pain, constipation, diarrhea and nausea.   Endocrine: Negative for polydipsia, polyphagia and polyuria.   Genitourinary: Negative for dysuria, frequency, menstrual problem, pelvic pain and urgency.   Musculoskeletal: Positive for arthralgias. Negative for back pain and joint swelling.   Integumentary:  Negative for rash, wound, breast mass and breast discharge.   Allergic/Immunologic: Negative for immunocompromised state.   Neurological: Negative for dizziness, vertigo, weakness and headaches.   Psychiatric/Behavioral: Negative for agitation, dysphoric mood and sleep disturbance.   All other systems reviewed and are negative.  Breast: Negative for mass        Objective:       Vitals:    04/04/22 1449   BP: (!) 136/58   Pulse: 93   Resp: 18   Temp: 98 °F (36.7 °C)     Physical Exam  Vitals and nursing note reviewed.   Constitutional:       Appearance: Normal appearance.   HENT:      Head: Normocephalic.      Right Ear: Tympanic membrane normal.      Left Ear: Tympanic membrane normal.      Nose: Nose normal.      Mouth/Throat:      Mouth: Mucous membranes are moist.   Eyes:      Pupils: Pupils are equal, round, and reactive to light.   Cardiovascular:      Rate and Rhythm: Normal rate and regular rhythm.   Pulmonary:      Effort: Pulmonary effort is normal.   Abdominal:      General: Abdomen is flat. Bowel sounds are normal.      Palpations: Abdomen is soft.   Musculoskeletal:         General: Normal range of motion.   Skin:     General: Skin is warm and dry.   Neurological:      General: No focal deficit present.      Mental Status: She is alert.   Psychiatric:         Mood and Affect: Mood normal.         Behavior: Behavior normal.         Assessment:       1. Type 2 diabetes mellitus without complication, with long-term current use of insulin    2. Hypertension, renal    3. Liver replaced by transplant    4. Hypomagnesemia        Plan:       Type 2 diabetes mellitus without complication, with long-term current use of insulin  -     Comprehensive Metabolic Panel; Future; Expected date: 04/04/2022  -     Hemoglobin A1C; Future; Expected date: 04/04/2022  -     Lipid Panel; Future; Expected date: 04/04/2022    Hypertension, renal    Liver replaced by transplant  -     CBC Auto Differential; Future; Expected date: 04/04/2022    Hypomagnesemia  -     Magnesium; Future; Expected date: 04/04/2022           No follow-ups on file.

## 2022-04-05 LAB
ALBUMIN SERPL BCP-MCNC: 3.3 G/DL (ref 3.5–5.2)
ALP SERPL-CCNC: 62 U/L (ref 55–135)
ALT SERPL W/O P-5'-P-CCNC: 26 U/L (ref 10–44)
ANION GAP SERPL CALC-SCNC: 11 MMOL/L (ref 8–16)
AST SERPL-CCNC: 31 U/L (ref 10–40)
BASOPHILS # BLD AUTO: 0.05 K/UL (ref 0–0.2)
BASOPHILS NFR BLD: 0.6 % (ref 0–1.9)
BILIRUB SERPL-MCNC: 0.7 MG/DL (ref 0.1–1)
BUN SERPL-MCNC: 19 MG/DL (ref 8–23)
CALCIUM SERPL-MCNC: 9.2 MG/DL (ref 8.7–10.5)
CHLORIDE SERPL-SCNC: 106 MMOL/L (ref 95–110)
CHOLEST SERPL-MCNC: 111 MG/DL (ref 120–199)
CHOLEST/HDLC SERPL: 2.5 {RATIO} (ref 2–5)
CO2 SERPL-SCNC: 24 MMOL/L (ref 23–29)
CREAT SERPL-MCNC: 0.8 MG/DL (ref 0.5–1.4)
DIFFERENTIAL METHOD: ABNORMAL
EOSINOPHIL # BLD AUTO: 0.2 K/UL (ref 0–0.5)
EOSINOPHIL NFR BLD: 2.6 % (ref 0–8)
ERYTHROCYTE [DISTWIDTH] IN BLOOD BY AUTOMATED COUNT: 12.5 % (ref 11.5–14.5)
EST. GFR  (AFRICAN AMERICAN): >60 ML/MIN/1.73 M^2
EST. GFR  (NON AFRICAN AMERICAN): >60 ML/MIN/1.73 M^2
ESTIMATED AVG GLUCOSE: 131 MG/DL (ref 68–131)
EXT ALBUMIN: 3.6
EXT ALKALINE PHOSPHATASE: 63
EXT ALT: 20
EXT AST: 25
EXT BILIRUBIN TOTAL: 0.6
EXT BUN: 13
EXT CALCIUM: 9
EXT CHLORIDE: 110
EXT CO2: 25
EXT CREATININE: 0.54 MG/DL
EXT GLUCOSE: 156
EXT HEMATOCRIT: 38.8
EXT HEMOGLOBIN: 12.4
EXT MAGNESIUM: 1.4
EXT PLATELETS: 185
EXT POTASSIUM: 3.9
EXT PROTEIN TOTAL: 5.3
EXT SODIUM: 142 MMOL/L
EXT TACROLIMUS LVL: 5
EXT TSH: 1.91
EXT WBC: 7.4
GLUCOSE SERPL-MCNC: 188 MG/DL (ref 70–110)
HBA1C MFR BLD: 6.2 % (ref 4–5.6)
HCT VFR BLD AUTO: 37.2 % (ref 37–48.5)
HDLC SERPL-MCNC: 44 MG/DL (ref 40–75)
HDLC SERPL: 39.6 % (ref 20–50)
HGB BLD-MCNC: 11.9 G/DL (ref 12–16)
IMM GRANULOCYTES # BLD AUTO: 0.04 K/UL (ref 0–0.04)
IMM GRANULOCYTES NFR BLD AUTO: 0.5 % (ref 0–0.5)
LDLC SERPL CALC-MCNC: 41.8 MG/DL (ref 63–159)
LYMPHOCYTES # BLD AUTO: 0.7 K/UL (ref 1–4.8)
LYMPHOCYTES NFR BLD: 7.8 % (ref 18–48)
MAGNESIUM SERPL-MCNC: 1.2 MG/DL (ref 1.6–2.6)
MCH RBC QN AUTO: 31.6 PG (ref 27–31)
MCHC RBC AUTO-ENTMCNC: 32 G/DL (ref 32–36)
MCV RBC AUTO: 99 FL (ref 82–98)
MONOCYTES # BLD AUTO: 0.4 K/UL (ref 0.3–1)
MONOCYTES NFR BLD: 4.4 % (ref 4–15)
NEUTROPHILS # BLD AUTO: 7.5 K/UL (ref 1.8–7.7)
NEUTROPHILS NFR BLD: 84.1 % (ref 38–73)
NONHDLC SERPL-MCNC: 67 MG/DL
NRBC BLD-RTO: 0 /100 WBC
PLATELET # BLD AUTO: 182 K/UL (ref 150–450)
PMV BLD AUTO: 10.2 FL (ref 9.2–12.9)
POTASSIUM SERPL-SCNC: 4.1 MMOL/L (ref 3.5–5.1)
PROT SERPL-MCNC: 5.8 G/DL (ref 6–8.4)
RBC # BLD AUTO: 3.77 M/UL (ref 4–5.4)
SODIUM SERPL-SCNC: 141 MMOL/L (ref 136–145)
TRIGL SERPL-MCNC: 126 MG/DL (ref 30–150)
WBC # BLD AUTO: 8.86 K/UL (ref 3.9–12.7)

## 2022-04-05 NOTE — PROGRESS NOTES
I spoke with patient . She will double her dose of Magnesium 400mg daily to bid.  Repeat Mag level in 4 mths

## 2022-05-02 ENCOUNTER — PATIENT MESSAGE (OUTPATIENT)
Dept: TRANSPLANT | Facility: CLINIC | Age: 67
End: 2022-05-02
Payer: MEDICARE

## 2022-05-02 ENCOUNTER — TELEPHONE (OUTPATIENT)
Dept: TRANSPLANT | Facility: CLINIC | Age: 67
End: 2022-05-02
Payer: MEDICARE

## 2022-05-02 NOTE — TELEPHONE ENCOUNTER
Labs are stable. No changes. Message sent   ----- Message from Jeremiah Contreras MD sent at 4/6/2022  3:29 PM CDT -----  Results reviewed

## 2022-05-04 ENCOUNTER — TELEPHONE (OUTPATIENT)
Dept: TRANSPLANT | Facility: CLINIC | Age: 67
End: 2022-05-04
Payer: MEDICARE

## 2022-05-04 NOTE — TELEPHONE ENCOUNTER
Spoke to patient,. She will repeat labs 5/9/22 and follow up with dr. Bhavin bautista. ----- Message from Jeremiah Contreras MD sent at 4/6/2022  3:29 PM CDT -----  Results reviewed

## 2022-05-11 ENCOUNTER — PATIENT MESSAGE (OUTPATIENT)
Dept: RESEARCH | Facility: CLINIC | Age: 67
End: 2022-05-11
Payer: MEDICARE

## 2022-05-31 ENCOUNTER — PATIENT MESSAGE (OUTPATIENT)
Dept: RESEARCH | Facility: CLINIC | Age: 67
End: 2022-05-31
Payer: MEDICARE

## 2022-06-02 ENCOUNTER — TELEPHONE (OUTPATIENT)
Dept: TRANSPLANT | Facility: CLINIC | Age: 67
End: 2022-06-02
Payer: MEDICARE

## 2022-06-02 NOTE — TELEPHONE ENCOUNTER
Letter sent, labs stable and no medication changes are needed. Repeat labs due 8/15/22 per protocol.

## 2022-06-02 NOTE — LETTER
June 2, 2022    Елена Atkinson  [address] [facility] [place] [address]          Dear Елена Atkinson:  MRN: [id number]    This is a follow up to your recent labs, your lab results were stable.  There are no medicine changes.  Please have your labs drawn again on 8/15/22.      If you cannot have your labs drawn on the scheduled date, it is your responsibility to call the transplant department to reschedule.  Please call [phone] and ask to speak to Jose Cruz - Medical Assistant for all scheduling requests.     Sincerely,    Kadi [facility]  [facility] [place] [address]  [phone]

## 2022-08-24 ENCOUNTER — TELEPHONE (OUTPATIENT)
Dept: TRANSPLANT | Facility: CLINIC | Age: 67
End: 2022-08-24
Payer: MEDICARE

## 2022-08-24 NOTE — LETTER
August 24, 2022    Елена Atkinson  [address] [facility] [place] [address]          Dear Елена Atkinson:  MRN: [id number]    Your lab work was due to be drawn on 8/15/22.  We contacted your lab and were unable to get test results.  It is very important to get your labs drawn as scheduled.  We cannot monitor you for rejection, infections, or drug toxicity side effects without lab results.  Please call us at [phone] as soon as possible to let us know when you plan to have labs drawn.    Sincerely,    Kadi [facility]  [facility] [place] [address]  [phone]

## 2022-09-02 LAB
EXT ALBUMIN: 3.5
EXT ALKALINE PHOSPHATASE: 75
EXT ALT: 19
EXT AST: 29
EXT BASOPHIL%: 0.6
EXT BILIRUBIN TOTAL: 0.6
EXT BUN: 10
EXT CALCIUM: 9
EXT CHLORIDE: 110
EXT CO2: 26
EXT CREATININE: 0.51 MG/DL
EXT EOSINOPHIL%: 5.4
EXT GLUCOSE: 101
EXT HEMATOCRIT: 38
EXT HEMOGLOBIN: 12.1
EXT LYMPH%: 13.5
EXT MONOCYTES%: 9.1
EXT NEUT%: 70.8
EXT PLATELETS: 197
EXT POTASSIUM: 3.7
EXT PROTEIN TOTAL: 5.6
EXT SODIUM: 143 MMOL/L
EXT TACROLIMUS LVL: 3.9
EXT WBC: 7.2

## 2022-09-06 ENCOUNTER — TELEPHONE (OUTPATIENT)
Dept: TRANSPLANT | Facility: CLINIC | Age: 67
End: 2022-09-06
Payer: MEDICARE

## 2022-09-06 NOTE — LETTER
September 6, 2022    Елена Atkinson  [address] [facility] [place] [address]          Dear Елена Atkinson:  MRN: [id number]    This is a follow up to your recent labs, your lab results were stable.  There are no medicine changes.  Please have your labs drawn again on 12/26/22. If these are stable, we will move your labs to every 6 months per our new lab protocol.      If you cannot have your labs drawn on the scheduled date, it is your responsibility to call the transplant department to reschedule.  Please call [phone] and ask to speak to [facility]  for all scheduling requests.     Sincerely,      Kadi [facility] [place]  [facility] [place] [address]  [phone]

## 2022-09-06 NOTE — TELEPHONE ENCOUNTER
Letter sent, labs stable and no medication changes are needed. Repeat labs due 12/26/22 per protocol.  ----- Message from Jeremiah Contreras MD sent at 9/6/2022 10:13 AM CDT -----  Results reviewed

## 2022-09-12 ENCOUNTER — TELEPHONE (OUTPATIENT)
Dept: FAMILY MEDICINE | Facility: CLINIC | Age: 67
End: 2022-09-12
Payer: MEDICARE

## 2022-09-20 ENCOUNTER — OFFICE VISIT (OUTPATIENT)
Dept: FAMILY MEDICINE | Facility: CLINIC | Age: 67
End: 2022-09-20
Payer: MEDICARE

## 2022-09-20 VITALS
BODY MASS INDEX: 31.93 KG/M2 | HEIGHT: 63 IN | DIASTOLIC BLOOD PRESSURE: 94 MMHG | OXYGEN SATURATION: 95 % | SYSTOLIC BLOOD PRESSURE: 180 MMHG | TEMPERATURE: 98 F | WEIGHT: 180.19 LBS | HEART RATE: 84 BPM

## 2022-09-20 DIAGNOSIS — Z79.4 TYPE 2 DIABETES MELLITUS WITHOUT COMPLICATION, WITH LONG-TERM CURRENT USE OF INSULIN: ICD-10-CM

## 2022-09-20 DIAGNOSIS — Z94.0 KIDNEY TRANSPLANT STATUS, LIVING RELATED DONOR: ICD-10-CM

## 2022-09-20 DIAGNOSIS — I63.9 CEREBROVASCULAR ACCIDENT (CVA), UNSPECIFIED MECHANISM: ICD-10-CM

## 2022-09-20 DIAGNOSIS — E83.42 HYPOMAGNESEMIA: ICD-10-CM

## 2022-09-20 DIAGNOSIS — Z94.4 LIVER REPLACED BY TRANSPLANT: ICD-10-CM

## 2022-09-20 DIAGNOSIS — I12.9 HYPERTENSION, RENAL: ICD-10-CM

## 2022-09-20 DIAGNOSIS — E11.9 TYPE 2 DIABETES MELLITUS WITHOUT COMPLICATION, WITH LONG-TERM CURRENT USE OF INSULIN: ICD-10-CM

## 2022-09-20 DIAGNOSIS — R41.3 OTHER AMNESIA: Primary | ICD-10-CM

## 2022-09-20 PROCEDURE — 99214 PR OFFICE/OUTPT VISIT, EST, LEVL IV, 30-39 MIN: ICD-10-PCS | Mod: S$GLB,,, | Performed by: FAMILY MEDICINE

## 2022-09-20 PROCEDURE — 99214 OFFICE O/P EST MOD 30 MIN: CPT | Mod: S$GLB,,, | Performed by: FAMILY MEDICINE

## 2022-09-20 RX ORDER — INSULIN DEGLUDEC INJECTION 100 U/ML
INJECTION, SOLUTION SUBCUTANEOUS
COMMUNITY
Start: 2022-06-07 | End: 2022-12-15 | Stop reason: SDUPTHER

## 2022-09-20 RX ORDER — LORAZEPAM 0.5 MG/1
0.5 TABLET ORAL 2 TIMES DAILY PRN
COMMUNITY
Start: 2022-03-25

## 2022-09-20 RX ORDER — CYCLOBENZAPRINE HCL 10 MG
10 TABLET ORAL DAILY PRN
COMMUNITY
Start: 2022-08-21

## 2022-09-20 RX ORDER — HYDROXYZINE PAMOATE 25 MG/1
25 CAPSULE ORAL DAILY PRN
COMMUNITY
Start: 2022-06-26

## 2022-09-20 RX ORDER — PROMETHAZINE HYDROCHLORIDE 25 MG/1
25 TABLET ORAL EVERY 6 HOURS PRN
COMMUNITY
Start: 2022-05-20

## 2022-09-20 NOTE — PROGRESS NOTES
Subjective:       Patient ID: Елена Atkinson is a 67 y.o. female.    Chief Complaint: Memory Loss (TIA last year, that's  when she noticed a difference. Dizziness when she gets up too quick.Unsteady gait.) and Medication Refill (Nephrologist retired and she would like her meds controlled here.)      Review of patient's allergies indicates:   Allergen Reactions    Lisinopril      Other reaction(s): Cough    Morphine      Other reaction(s): Itching, Other (See Comments)  Stomach cramps        General concerns    Medication Refill  Pertinent negatives include no abdominal pain, chest pain, chills, fatigue, fever, headaches, joint swelling, nausea, rash, sore throat, vertigo or weakness.   Review of Systems   Constitutional:  Negative for chills, fatigue, fever and unexpected weight change.   HENT:  Negative for ear pain, rhinorrhea, sinus pressure/congestion, sneezing and sore throat.    Eyes:  Negative for photophobia and pain.   Respiratory:  Negative for chest tightness, shortness of breath and wheezing.    Cardiovascular:  Negative for chest pain.   Gastrointestinal:  Negative for abdominal distention, abdominal pain, constipation, diarrhea and nausea.   Endocrine: Negative for polydipsia, polyphagia and polyuria.   Genitourinary:  Negative for dysuria, frequency, menstrual problem, pelvic pain and urgency.   Musculoskeletal:  Negative for back pain and joint swelling.   Integumentary:  Negative for rash, wound, breast mass and breast discharge.   Allergic/Immunologic: Negative for immunocompromised state.   Neurological:  Negative for dizziness, vertigo, weakness and headaches.   Psychiatric/Behavioral:  Negative for agitation, dysphoric mood and sleep disturbance.    All other systems reviewed and are negative.  Breast: Negative for mass      Objective:      Vitals:    09/20/22 1534   BP: (!) 180/94   Pulse: 84   Temp: 97.8 °F (36.6 °C)     Physical Exam  Vitals and nursing note reviewed.   Constitutional:        Appearance: Normal appearance.   HENT:      Head: Normocephalic.      Right Ear: Tympanic membrane normal.      Left Ear: Tympanic membrane normal.      Nose: Nose normal.      Mouth/Throat:      Mouth: Mucous membranes are moist.   Eyes:      Pupils: Pupils are equal, round, and reactive to light.   Cardiovascular:      Rate and Rhythm: Normal rate and regular rhythm.   Pulmonary:      Effort: Pulmonary effort is normal.   Abdominal:      General: Abdomen is flat. Bowel sounds are normal.      Palpations: Abdomen is soft.   Musculoskeletal:         General: Normal range of motion.   Skin:     General: Skin is warm and dry.   Neurological:      General: No focal deficit present.      Mental Status: She is alert.   Psychiatric:         Mood and Affect: Mood normal.         Behavior: Behavior normal.     All lab is normal  Assessment:       1. Other amnesia    2. Type 2 diabetes mellitus without complication, with long-term current use of insulin    3. Hypertension, renal    4. Liver replaced by transplant    5. Kidney transplant status, living related donor 8/19/2009    6. Hypomagnesemia    7. Cerebrovascular accident (CVA), unspecified mechanism          Plan:       Other amnesia    Type 2 diabetes mellitus without complication, with long-term current use of insulin    Hypertension, renal    Liver replaced by transplant    Kidney transplant status, living related donor 8/19/2009    Hypomagnesemia    Cerebrovascular accident (CVA), unspecified mechanism         Follow up in about 2 weeks (around 10/4/2022).

## 2022-09-21 ENCOUNTER — PATIENT OUTREACH (OUTPATIENT)
Dept: ADMINISTRATIVE | Facility: HOSPITAL | Age: 67
End: 2022-09-21
Payer: MEDICARE

## 2022-09-21 NOTE — LETTER
September 21, 2022        [facility]                          FAX      AUTHORIZATION FOR RELEASE OF   CONFIDENTIAL INFORMATION        [facility]      We are seeing Елена Atkinson, date of birth 1955, in the clinic at [facility]. Gianni Alonso MD is the patient's PCP. Елена Atkinson has an outstanding lab/procedure at the time we reviewed their chart. In order to help keep their health information updated, Елена Atkinson has authorized us to request the following medical record(s):       ( X )  COLONOSCOPY (WITHIN 10 YRS)       Please fax records to [facility]  [phone]        LAURENT CLEMENTS LPN CLINICAL CARE COORDINATOR   [facility] [address]  [place] [address]  PHONE: [phone]  FAX: [phone]

## 2022-09-21 NOTE — PROGRESS NOTES
Population Health Outreach.    PATIENT REFUSE MAMMOGRAM DUE TO PRIOR INJURY  REQUESTED COLONOSCOPY FROM [facility]

## 2022-09-22 ENCOUNTER — PATIENT OUTREACH (OUTPATIENT)
Dept: ADMINISTRATIVE | Facility: HOSPITAL | Age: 67
End: 2022-09-22
Payer: MEDICARE

## 2022-09-22 NOTE — PROGRESS NOTES
Population Health Outreach.    No colonoscopy found at [facility] 9/22/22

## 2022-09-27 ENCOUNTER — TELEPHONE (OUTPATIENT)
Dept: TRANSPLANT | Facility: CLINIC | Age: 67
End: 2022-09-27
Payer: MEDICARE

## 2022-10-18 ENCOUNTER — OFFICE VISIT (OUTPATIENT)
Dept: FAMILY MEDICINE | Facility: CLINIC | Age: 67
End: 2022-10-18
Payer: MEDICARE

## 2022-10-18 VITALS
BODY MASS INDEX: 32.05 KG/M2 | DIASTOLIC BLOOD PRESSURE: 78 MMHG | RESPIRATION RATE: 16 BRPM | OXYGEN SATURATION: 95 % | TEMPERATURE: 98 F | SYSTOLIC BLOOD PRESSURE: 130 MMHG | HEART RATE: 103 BPM | HEIGHT: 63 IN | WEIGHT: 180.88 LBS

## 2022-10-18 DIAGNOSIS — N39.0 URINARY TRACT INFECTION WITHOUT HEMATURIA, SITE UNSPECIFIED: Primary | ICD-10-CM

## 2022-10-18 DIAGNOSIS — R20.2 PARESTHESIA: ICD-10-CM

## 2022-10-18 PROCEDURE — 99214 OFFICE O/P EST MOD 30 MIN: CPT | Mod: S$GLB,,, | Performed by: FAMILY MEDICINE

## 2022-10-18 PROCEDURE — 99214 PR OFFICE/OUTPT VISIT, EST, LEVL IV, 30-39 MIN: ICD-10-PCS | Mod: S$GLB,,, | Performed by: FAMILY MEDICINE

## 2022-10-18 RX ORDER — INSULIN LISPRO 100 [IU]/ML
INJECTION, SOLUTION INTRAVENOUS; SUBCUTANEOUS
COMMUNITY
End: 2023-01-03

## 2022-10-18 NOTE — PROGRESS NOTES
Subjective:       Patient ID: Елена Atkinson is a 67 y.o. female.    Chief Complaint: Follow-up and Neurologic Problem (Seen a specialist on Igor 10/16/2022)      Review of patient's allergies indicates:   Allergen Reactions    Lisinopril      Other reaction(s): Cough    Morphine      Other reaction(s): Itching, Other (See Comments)  Stomach cramps        Follow-up  Pertinent negatives include no abdominal pain, chest pain, chills, fatigue, fever, headaches, joint swelling, nausea, rash, sore throat, vertigo or weakness.   Neurologic Problem  The patient's pertinent negatives include no weakness. Pertinent negatives include no abdominal pain, back pain, chest pain, dizziness, fatigue, fever, headaches, nausea, shortness of breath or vertigo.   Review of Systems   Constitutional:  Negative for chills, fatigue, fever and unexpected weight change.   HENT:  Negative for ear pain, rhinorrhea, sinus pressure/congestion, sneezing and sore throat.    Eyes:  Negative for photophobia and pain.   Respiratory:  Negative for chest tightness, shortness of breath and wheezing.    Cardiovascular:  Negative for chest pain.   Gastrointestinal:  Negative for abdominal distention, abdominal pain, constipation, diarrhea and nausea.   Endocrine: Negative for polydipsia, polyphagia and polyuria.   Genitourinary:  Negative for dysuria, frequency, menstrual problem, pelvic pain and urgency.   Musculoskeletal:  Negative for back pain and joint swelling.   Integumentary:  Negative for rash, wound, breast mass and breast discharge.   Allergic/Immunologic: Negative for immunocompromised state.   Neurological:  Negative for dizziness, vertigo, weakness and headaches.   Psychiatric/Behavioral:  Negative for agitation, dysphoric mood and sleep disturbance.    All other systems reviewed and are negative.  Breast: Negative for mass      Objective:      Vitals:    10/18/22 1536   BP: 130/78   Pulse: 103   Resp: 16   Temp: 97.8 °F (36.6 °C)      Physical Exam  Vitals and nursing note reviewed.   Constitutional:       Appearance: Normal appearance.   HENT:      Head: Normocephalic.      Right Ear: Tympanic membrane normal.      Left Ear: Tympanic membrane normal.      Nose: Nose normal.      Mouth/Throat:      Mouth: Mucous membranes are moist.   Eyes:      Pupils: Pupils are equal, round, and reactive to light.   Cardiovascular:      Rate and Rhythm: Normal rate and regular rhythm.   Pulmonary:      Effort: Pulmonary effort is normal.   Abdominal:      General: Abdomen is flat. Bowel sounds are normal.      Palpations: Abdomen is soft.   Musculoskeletal:         General: Normal range of motion.   Skin:     General: Skin is warm and dry.   Neurological:      General: No focal deficit present.      Mental Status: She is alert.   Psychiatric:         Mood and Affect: Mood normal.         Behavior: Behavior normal.       Assessment:       1. Urinary tract infection without hematuria, site unspecified    2. Paresthesia          Plan:       Urinary tract infection without hematuria, site unspecified  -     Urinalysis, Reflex to Urine Culture Urine, Clean Catch; Future; Expected date: 10/18/2022    Paresthesia  -     EMG W/ ULTRASOUND AND NERVE CONDUCTION TEST 2 Extremities; Future         No follow-ups on file.

## 2022-11-21 ENCOUNTER — TELEPHONE (OUTPATIENT)
Dept: TRANSPLANT | Facility: CLINIC | Age: 67
End: 2022-11-21
Payer: MEDICARE

## 2022-12-15 RX ORDER — INSULIN DEGLUDEC 100 U/ML
50 INJECTION, SOLUTION SUBCUTANEOUS DAILY
Qty: 50 ML | Refills: 3 | Status: SHIPPED | OUTPATIENT
Start: 2022-12-15 | End: 2023-01-03 | Stop reason: SDUPTHER

## 2022-12-15 NOTE — TELEPHONE ENCOUNTER
----- Message from Braydon Ingram sent at 12/15/2022 12:14 PM CST -----  Contact: Self  Type:  RX Refill Request    Who Called:  Patient  Refill or New Rx:  Refill  RX Name and Strength:  insulin degludec (TRESIBA U-100 INSULIN) 100 unit/mL injection  Prednisone 1mg (not on chart, 3x in morning)  How is the patient currently taking it? (ex. 1XDay):  as directed  Is this a 30 day or 90 day RX:  90  Preferred Pharmacy with phone number:        [facility] ([facility] ) - [place], [place] - [address]  [address]  [facility] [address]  [facility] [address]  Phone: [phone] Fax: [phone]      Local or Mail Order:  Local  Ordering Provider:  Celena.  Best Call Back Number:  [phone]   Additional Information:

## 2022-12-20 RX ORDER — PREDNISONE 1 MG/1
1 TABLET ORAL 3 TIMES DAILY
Qty: 90 TABLET | Refills: 5 | Status: SHIPPED | OUTPATIENT
Start: 2022-12-20

## 2022-12-28 ENCOUNTER — PATIENT MESSAGE (OUTPATIENT)
Dept: TRANSPLANT | Facility: CLINIC | Age: 67
End: 2022-12-28
Payer: MEDICARE

## 2023-01-03 ENCOUNTER — OFFICE VISIT (OUTPATIENT)
Dept: FAMILY MEDICINE | Facility: CLINIC | Age: 68
End: 2023-01-03
Payer: MEDICARE

## 2023-01-03 VITALS
WEIGHT: 181 LBS | HEART RATE: 104 BPM | SYSTOLIC BLOOD PRESSURE: 138 MMHG | TEMPERATURE: 98 F | DIASTOLIC BLOOD PRESSURE: 92 MMHG | BODY MASS INDEX: 32.07 KG/M2 | HEIGHT: 63 IN | OXYGEN SATURATION: 98 %

## 2023-01-03 DIAGNOSIS — Z94.4 LIVER REPLACED BY TRANSPLANT: ICD-10-CM

## 2023-01-03 DIAGNOSIS — E11.9 TYPE 2 DIABETES MELLITUS WITHOUT COMPLICATION, WITH LONG-TERM CURRENT USE OF INSULIN: Primary | ICD-10-CM

## 2023-01-03 DIAGNOSIS — Z79.4 TYPE 2 DIABETES MELLITUS WITHOUT COMPLICATION, WITH LONG-TERM CURRENT USE OF INSULIN: Primary | ICD-10-CM

## 2023-01-03 DIAGNOSIS — Z94.0 KIDNEY TRANSPLANT STATUS, LIVING RELATED DONOR: ICD-10-CM

## 2023-01-03 DIAGNOSIS — I12.9 HYPERTENSION, RENAL: ICD-10-CM

## 2023-01-03 PROCEDURE — 99214 PR OFFICE/OUTPT VISIT, EST, LEVL IV, 30-39 MIN: ICD-10-PCS | Mod: S$GLB,,, | Performed by: FAMILY MEDICINE

## 2023-01-03 PROCEDURE — 99214 OFFICE O/P EST MOD 30 MIN: CPT | Mod: S$GLB,,, | Performed by: FAMILY MEDICINE

## 2023-01-03 RX ORDER — LOSARTAN POTASSIUM 50 MG/1
50 TABLET ORAL DAILY
Qty: 90 TABLET | Refills: 1 | Status: SHIPPED | OUTPATIENT
Start: 2023-01-03

## 2023-01-03 RX ORDER — ESCITALOPRAM OXALATE 10 MG/1
10 TABLET ORAL DAILY
Qty: 90 TABLET | Refills: 1 | Status: SHIPPED | OUTPATIENT
Start: 2023-01-03 | End: 2023-07-07

## 2023-01-03 RX ORDER — INSULIN DEGLUDEC 100 U/ML
50 INJECTION, SOLUTION SUBCUTANEOUS DAILY
Qty: 50 ML | Refills: 3 | Status: SHIPPED | OUTPATIENT
Start: 2023-01-03 | End: 2023-12-18

## 2023-01-03 RX ORDER — MYCOPHENOLATE MOFETIL 250 MG/1
750 CAPSULE ORAL 2 TIMES DAILY
Qty: 180 CAPSULE | Refills: 5 | Status: SHIPPED | OUTPATIENT
Start: 2023-01-03 | End: 2023-08-15

## 2023-01-03 RX ORDER — HYDRALAZINE HYDROCHLORIDE 25 MG/1
25 TABLET, FILM COATED ORAL 2 TIMES DAILY
Qty: 180 TABLET | Refills: 1 | Status: SHIPPED | OUTPATIENT
Start: 2023-01-03 | End: 2023-07-07

## 2023-01-03 RX ORDER — ALLOPURINOL 300 MG/1
300 TABLET ORAL EVERY MORNING
Qty: 90 TABLET | Refills: 1 | Status: SHIPPED | OUTPATIENT
Start: 2023-01-03 | End: 2023-05-25

## 2023-01-03 RX ORDER — METFORMIN HYDROCHLORIDE 500 MG/1
500 TABLET, EXTENDED RELEASE ORAL 2 TIMES DAILY
Qty: 180 TABLET | Refills: 1 | Status: SHIPPED | OUTPATIENT
Start: 2023-01-03 | End: 2023-08-24

## 2023-01-03 RX ORDER — TACROLIMUS 0.5 MG/1
0.5 CAPSULE ORAL EVERY 12 HOURS
Qty: 60 CAPSULE | Refills: 6 | Status: SHIPPED | OUTPATIENT
Start: 2023-01-03 | End: 2023-09-25

## 2023-01-03 RX ORDER — ATORVASTATIN CALCIUM 20 MG/1
20 TABLET, FILM COATED ORAL NIGHTLY
Qty: 90 TABLET | Refills: 1 | Status: SHIPPED | OUTPATIENT
Start: 2023-01-03 | End: 2023-05-11

## 2023-01-03 RX ORDER — LEVOTHYROXINE SODIUM 75 UG/1
75 TABLET ORAL
Qty: 90 TABLET | Refills: 1 | Status: SHIPPED | OUTPATIENT
Start: 2023-01-03 | End: 2023-06-20

## 2023-01-03 RX ORDER — TEMAZEPAM 15 MG/1
15 CAPSULE ORAL DAILY PRN
Qty: 30 CAPSULE | Refills: 5 | Status: SHIPPED | OUTPATIENT
Start: 2023-01-03 | End: 2023-08-24

## 2023-01-03 NOTE — PROGRESS NOTES
Subjective:       Patient ID: Елена Atkinson is a 67 y.o. female.    Chief Complaint: Medication Refill      Review of patient's allergies indicates:   Allergen Reactions    Lisinopril      Other reaction(s): Cough    Morphine      Other reaction(s): Itching, Other (See Comments)  Stomach cramps        NO COMPLAINTS NEEDING MEDS REFILLED    Medication Refill  Associated symptoms include weakness. Pertinent negatives include no abdominal pain, arthralgias, chest pain, chills, fatigue, fever, headaches, joint swelling, nausea, neck pain, rash, sore throat, vertigo or vomiting.   Review of Systems   Constitutional:  Positive for activity change. Negative for chills, fatigue, fever and unexpected weight change.   HENT:  Positive for rhinorrhea. Negative for ear pain, hearing loss, sinus pressure/congestion, sneezing, sore throat and trouble swallowing.    Eyes:  Negative for photophobia, pain, discharge and visual disturbance.   Respiratory:  Positive for wheezing. Negative for chest tightness and shortness of breath.    Cardiovascular:  Negative for chest pain and palpitations.   Gastrointestinal:  Negative for abdominal distention, abdominal pain, blood in stool, constipation, diarrhea, nausea and vomiting.   Endocrine: Negative for polydipsia, polyphagia and polyuria.   Genitourinary:  Negative for difficulty urinating, dysuria, frequency, hematuria, menstrual problem, pelvic pain and urgency.   Musculoskeletal:  Negative for arthralgias, back pain, joint swelling and neck pain.   Integumentary:  Negative for rash, wound, breast mass and breast discharge.   Allergic/Immunologic: Negative for immunocompromised state.   Neurological:  Positive for weakness. Negative for dizziness, vertigo and headaches.   Psychiatric/Behavioral:  Negative for agitation, confusion, dysphoric mood and sleep disturbance.    Breast: Negative for mass      Objective:      Vitals:    01/03/23 1620   BP: (!) 138/92   Pulse: 104   Temp:  97.8 °F (36.6 °C)     Physical Exam  Vitals and nursing note reviewed.   Constitutional:       Appearance: Normal appearance.   HENT:      Head: Normocephalic.      Right Ear: Tympanic membrane normal.      Left Ear: Tympanic membrane normal.      Nose: Nose normal.      Mouth/Throat:      Mouth: Mucous membranes are moist.   Eyes:      Pupils: Pupils are equal, round, and reactive to light.   Cardiovascular:      Rate and Rhythm: Normal rate and regular rhythm.   Pulmonary:      Effort: Pulmonary effort is normal.   Abdominal:      General: Abdomen is flat. Bowel sounds are normal.      Palpations: Abdomen is soft.   Musculoskeletal:         General: Normal range of motion.   Skin:     General: Skin is warm and dry.   Neurological:      General: No focal deficit present.      Mental Status: She is alert.   Psychiatric:         Mood and Affect: Mood normal.         Behavior: Behavior normal.       Assessment:       No diagnosis found.    Plan:       There are no diagnoses linked to this encounter.       No follow-ups on file.

## 2023-01-04 ENCOUNTER — PATIENT MESSAGE (OUTPATIENT)
Dept: ADMINISTRATIVE | Facility: HOSPITAL | Age: 68
End: 2023-01-04
Payer: MEDICARE

## 2023-01-04 DIAGNOSIS — E11.9 TYPE 2 DIABETES MELLITUS WITHOUT COMPLICATION, UNSPECIFIED WHETHER LONG TERM INSULIN USE: ICD-10-CM

## 2023-01-09 ENCOUNTER — OFFICE VISIT (OUTPATIENT)
Dept: TRANSPLANT | Facility: CLINIC | Age: 68
End: 2023-01-09
Payer: MEDICARE

## 2023-01-09 VITALS
OXYGEN SATURATION: 96 % | SYSTOLIC BLOOD PRESSURE: 142 MMHG | DIASTOLIC BLOOD PRESSURE: 70 MMHG | HEART RATE: 92 BPM | HEIGHT: 63 IN | BODY MASS INDEX: 31.92 KG/M2 | RESPIRATION RATE: 16 BRPM | WEIGHT: 180.13 LBS | TEMPERATURE: 98 F

## 2023-01-09 DIAGNOSIS — Z79.4 TYPE 2 DIABETES MELLITUS WITHOUT COMPLICATION, WITH LONG-TERM CURRENT USE OF INSULIN: ICD-10-CM

## 2023-01-09 DIAGNOSIS — E11.9 TYPE 2 DIABETES MELLITUS WITHOUT COMPLICATION, WITH LONG-TERM CURRENT USE OF INSULIN: ICD-10-CM

## 2023-01-09 DIAGNOSIS — Z94.0 KIDNEY TRANSPLANT STATUS, LIVING RELATED DONOR: Chronic | ICD-10-CM

## 2023-01-09 DIAGNOSIS — Z94.4 LIVER REPLACED BY TRANSPLANT: Primary | ICD-10-CM

## 2023-01-09 LAB
EXT ALBUMIN: 3.7
EXT ALKALINE PHOSPHATASE: 105
EXT ALT: 22
EXT AST: 24
EXT BASOPHIL%: 0.7
EXT BILIRUBIN TOTAL: 0.6
EXT BUN: 13
EXT CALCIUM: 9.1
EXT CHLORIDE: 109
EXT CO2: 25
EXT CREATININE: 0.55 MG/DL
EXT EOSINOPHIL%: 4.9
EXT GLUCOSE: 91
EXT HEMATOCRIT: 39.3
EXT HEMOGLOBIN: 12.6
EXT LYMPH%: 11.6
EXT MONOCYTES%: 8.3
EXT PLATELETS: 248
EXT POTASSIUM: 3.9
EXT PROTEIN TOTAL: 5.5
EXT SEGS%: 73.3
EXT SODIUM: 141 MMOL/L
EXT TACROLIMUS LVL: 5.7
EXT WBC: 9.2

## 2023-01-09 PROCEDURE — 99205 OFFICE O/P NEW HI 60 MIN: CPT | Mod: S$PBB,,, | Performed by: INTERNAL MEDICINE

## 2023-01-09 PROCEDURE — 99999 PR PBB SHADOW E&M-EST. PATIENT-LVL IV: CPT | Mod: PBBFAC,,, | Performed by: INTERNAL MEDICINE

## 2023-01-09 PROCEDURE — 99205 PR OFFICE/OUTPT VISIT, NEW, LEVL V, 60-74 MIN: ICD-10-PCS | Mod: S$PBB,,, | Performed by: INTERNAL MEDICINE

## 2023-01-09 PROCEDURE — 99214 OFFICE O/P EST MOD 30 MIN: CPT | Mod: PBBFAC | Performed by: INTERNAL MEDICINE

## 2023-01-09 PROCEDURE — 99999 PR PBB SHADOW E&M-EST. PATIENT-LVL IV: ICD-10-PCS | Mod: PBBFAC,,, | Performed by: INTERNAL MEDICINE

## 2023-01-09 NOTE — PROGRESS NOTES
Subjective:       Patient ID: Елена Atkinson is a 67 y.o. female.    Chief Complaint: Liver Transplant Follow-up    HPI  Ms. Atkinson is a 67 y.o. year old  female who received a  donor liver transplant for HCV cirrhosis at [facility] in 2003.  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    Not seen here since Oct 2019.    Donor CMV Status: Negative   Recipient CMV Status: Positive     She had mild recurrent HCV infection. Her last liver biopsy was in 2012 but she had an elastography test in 2015.  For Harvoni/riba 24 weeks- completed on 10/2015  SVR 12 on 2016    - 2015 elastography showed F2-3  - G1a    Labs 1/3/2023  Currently LFTs/Creat are normal  Last Tac level was 5.7    She tells me that she had an episode of KRISTOFER in 2015 from which she recovered completely.    Last liver biopsy: 2012  NO EVIDENCE OF REJECTION.  CHRONIC HEPATITIS C WITH MILD ACTIVITY  AND RARE APOPTOTIC BODIES (GRADE 1-2 OUT OF 4).  PORTAL FIBROSIS (STAGE 1 OUT OF 4)  NO EVIDENCE OF CIRRHOSIS.  STEATOSIS, LESS THAN 1%.  NO IRON.  IRON AND TRICHROME WITH APPROPRIATE CONTROLS    Abdominal US: 2015  Normal    Body mass index is 31.91 kg/m².      Lab Results   Component Value Date    ALT 19 2022    AST 29 2022     (H) 2011    ALKPHOS 75 2022    BILITOT 0.7 2022     Past Medical History:   Diagnosis Date    Brain TIA 2021    CKD (chronic kidney disease) 2013    ESRD (end stage renal disease) 2007    IgA Nephropathy    Gout     History of HCV, s/p successful Rx w/ SVR - 2016     Liver biopsy 12: Stage 1 fibrosis Completed 24 weeks Harvoni + RBV w/ SVR    Hypertension, renal     IgA nephropathy     Immunosuppressive management encounter following kidney transplant     Kidney transplant status,  living related donor 08/19/2009    Obesity     Type 2 diabetes mellitus      Past Surgical History:   Procedure Laterality Date    CARDIAC SURGERY      KIDNEY TRANSPLANT      LIVER TRANSPLANT      TUBAL LIGATION       Current Outpatient Medications   Medication Sig    allopurinoL (ZYLOPRIM) 300 MG tablet Take 1 tablet (300 mg total) by mouth every morning.    atorvastatin (LIPITOR) 20 MG tablet Take 1 tablet (20 mg total) by mouth every evening.    cyanocobalamin (VITAMIN B-12) 100 MCG tablet Take 100 mcg by mouth after lunch.     cyclobenzaprine (FLEXERIL) 10 MG tablet Take 10 mg by mouth daily as needed.    EScitalopram oxalate (LEXAPRO) 10 MG tablet Take 1 tablet (10 mg total) by mouth once daily.    hydrALAZINE (APRESOLINE) 25 MG tablet Take 1 tablet (25 mg total) by mouth 2 (two) times daily. Has been taking twice a day    hydrOXYzine pamoate (VISTARIL) 25 MG Cap Take 25 mg by mouth daily as needed.    insulin degludec (TRESIBA U-100 INSULIN) 100 unit/mL injection Inject 0.5 mLs (50 Units total) into the skin once daily.    levothyroxine (SYNTHROID) 75 MCG tablet Take 1 tablet (75 mcg total) by mouth before breakfast.    LORazepam (ATIVAN) 0.5 MG tablet Take 0.5 mg by mouth 2 (two) times daily as needed.    losartan (COZAAR) 50 MG tablet Take 1 tablet (50 mg total) by mouth once daily.    magnesium glycinate 100 mg Tab Take 5 tablets by mouth once daily. 500 mg daily    metFORMIN (GLUCOPHAGE-XR) 500 MG ER 24hr tablet Take 1 tablet (500 mg total) by mouth 2 (two) times daily.    multivitamin (THERAGRAN) per tablet Take 1 tablet by mouth after lunch.     mycophenolate (CELLCEPT) 250 mg Cap Take 3 capsules (750 mg total) by mouth 2 (two) times daily.    predniSONE (DELTASONE) 1 MG tablet Take 1 tablet (1 mg total) by mouth 3 (three) times daily. (Patient taking differently: Take 3 mg by mouth once daily.)    tacrolimus (PROGRAF) 0.5 MG Cap Take 1 capsule (0.5 mg total) by mouth every 12 (twelve) hours.     promethazine (PHENERGAN) 25 MG tablet Take 25 mg by mouth every 6 (six) hours as needed.    temazepam (RESTORIL) 15 mg Cap Take 1 capsule (15 mg total) by mouth daily as needed (SLEEP). (Patient not taking: Reported on 1/9/2023)     No current facility-administered medications for this visit.     Review of Systems   Constitutional:  Negative for activity change, appetite change, chills, fatigue, fever and unexpected weight change.   HENT:  Negative for ear pain, hearing loss, nosebleeds, sore throat and trouble swallowing.    Eyes:  Negative for redness and visual disturbance.   Respiratory:  Negative for cough, chest tightness, shortness of breath and wheezing.    Cardiovascular:  Negative for chest pain and palpitations.   Gastrointestinal:  Negative for abdominal distention, abdominal pain, blood in stool, constipation, diarrhea, nausea and vomiting.   Genitourinary:  Negative for difficulty urinating, dysuria, frequency, hematuria and urgency.   Musculoskeletal:  Negative for arthralgias, back pain, gait problem, joint swelling and myalgias.   Skin:  Negative for rash.   Neurological:  Negative for tremors, seizures, speech difficulty, weakness and headaches.   Hematological:  Negative for adenopathy.   Psychiatric/Behavioral:  Negative for confusion, decreased concentration and sleep disturbance. The patient is not nervous/anxious.        Objective:      Physical Exam  Constitutional:       General: She is not in acute distress.     Appearance: She is well-developed.   HENT:      Head: Normocephalic.   Eyes:      Pupils: Pupils are equal, round, and reactive to light.   Neck:      Thyroid: No thyromegaly.      Vascular: No JVD.      Trachea: No tracheal deviation.   Cardiovascular:      Rate and Rhythm: Normal rate and regular rhythm.      Heart sounds: Normal heart sounds. No murmur heard.  Pulmonary:      Effort: Pulmonary effort is normal.      Breath sounds: Normal breath sounds. No stridor.   Abdominal:       Palpations: Abdomen is soft.   Lymphadenopathy:      Head:      Right side of head: No submental, submandibular, tonsillar, preauricular, posterior auricular or occipital adenopathy.      Left side of head: No submental, submandibular, tonsillar, preauricular, posterior auricular or occipital adenopathy.      Cervical: No cervical adenopathy.   Neurological:      Mental Status: She is alert. She is not disoriented.      Cranial Nerves: No cranial nerve deficit.      Sensory: No sensory deficit.       Assessment:       1. Liver replaced by transplant for HCV 7/2003    2. Type 2 diabetes mellitus without complication, with long-term current use of insulin    3. Kidney transplant status, living related donor 8/19/2009          Plan:   1. No change in immunosuppression- Tac/MMF 750mg BID and predn 3 mg daily  2. Ongoing kidney Tx follow up in [place]- BP monitored by nephrologist  3. Labs every 3 months  4. Has ongoing dermatology surveillance  5. Clinic in about 1 year      Functional Status: 100% - Normal, no complaints, no evidence of disease  Physical Capacity: No Limitations

## 2023-01-09 NOTE — LETTER
January 13, 2023        Grayson Hawthorne  [address]  [facility] [place] [facility]  [place] [address]  Phone: [phone]  Fax: [phone]     Gianni Alonso  [address]  [place] [address]  Phone: [phone]  Fax: [phone]             [facility]st [facility]  [address]  [facility] [address]  Phone: [phone]   Patient: Елена Atkinson   MR Number: [id number]   YOB: 1955   Date of Visit: 1/9/2023       Dear Dr. Grayson Hawthorne, Gianni Alonso    Thank you for referring Елена Atkinson to me for evaluation. Attached you will find relevant portions of my assessment and plan of care.    If you have questions, please do not hesitate to call me. I look forward to following Елена Atkinson along with you.    Sincerely,    Jeremiah Contreras MD    Enclosure    If you would like to receive this communication electronically, please contact [email or web address] or [phone] to request [employer] Link access.    [employer] Link is a tool which provides read-only access to select patient information with whom you have a relationship. Its easy to use and provides real time access to review your patients record including encounter summaries, notes, results, and demographic information.    If you feel you have received this communication in error or would no longer like to receive these types of communications, please e-mail [email or web address]

## 2023-01-13 ENCOUNTER — TELEPHONE (OUTPATIENT)
Dept: TRANSPLANT | Facility: CLINIC | Age: 68
End: 2023-01-13
Payer: MEDICARE

## 2023-01-13 NOTE — TELEPHONE ENCOUNTER
Letter sent, labs stable and no medication changes are needed. Repeat labs due 5/8/23 per protocol.  ----- Message from Jeremiah Contreras MD sent at 1/13/2023  9:32 AM CST -----  Results reviewed

## 2023-01-13 NOTE — LETTER
January 13, 2023    Елена Atkinson  [address] [facility] [place] [address]          Dear Елена Atkinson:  MRN: [id number]    This is a follow up to your recent labs, your lab results were stable.  There are no medicine changes.  Please have your labs drawn again on 5/8/23.      If you cannot have your labs drawn on the scheduled date, it is your responsibility to call the transplant department to reschedule.  Please call [phone] and ask to speak to [facility]  for all scheduling requests.     Sincerely,      Kadi [facility]  [facility] [place] [address]  [phone]

## 2023-04-11 ENCOUNTER — PATIENT MESSAGE (OUTPATIENT)
Dept: ADMINISTRATIVE | Facility: HOSPITAL | Age: 68
End: 2023-04-11
Payer: MEDICARE

## 2023-04-17 ENCOUNTER — PATIENT OUTREACH (OUTPATIENT)
Dept: ADMINISTRATIVE | Facility: HOSPITAL | Age: 68
End: 2023-04-17
Payer: MEDICARE

## 2023-05-11 ENCOUNTER — TELEPHONE (OUTPATIENT)
Dept: TRANSPLANT | Facility: CLINIC | Age: 68
End: 2023-05-11
Payer: MEDICARE

## 2023-05-11 RX ORDER — ATORVASTATIN CALCIUM 20 MG/1
TABLET, FILM COATED ORAL
Qty: 90 TABLET | Refills: 3 | Status: SHIPPED | OUTPATIENT
Start: 2023-05-11

## 2023-05-12 ENCOUNTER — TELEPHONE (OUTPATIENT)
Dept: TRANSPLANT | Facility: CLINIC | Age: 68
End: 2023-05-12
Payer: MEDICARE

## 2023-05-12 NOTE — LETTER
May 12, 2023    Елена Atkinson  [address] [facility] [place] [address]          Dear Елена Atkinson:  MRN: [id number]    Your lab work was due to be drawn on 5/8/23.  We contacted your lab and were unable to get test results.  It is very important to get your labs drawn as scheduled.  We cannot monitor you for rejection, infections, or drug toxicity side effects without lab results.  Please call us at [phone] as soon as possible to let us know when you plan to have labs drawn.    Sincerely,        Your Liver Transplant Coordinator    [facility]  [address] [place] [address]  [phone]

## 2023-05-24 LAB
EXT ALBUMIN: 3.7
EXT ALKALINE PHOSPHATASE: 74
EXT ALT: 25
EXT AST: 25
EXT BASOPHIL%: 0.7
EXT BILIRUBIN TOTAL: 0.6
EXT BUN: 12
EXT CALCIUM: 9.2
EXT CHLORIDE: 108
EXT CO2: 27
EXT CREATININE: 0.53 MG/DL
EXT EGFR NO RACE VARIABLE: >90
EXT EOSINOPHIL%: 5
EXT GLUCOSE: 130
EXT HEMATOCRIT: 38.3
EXT HEMOGLOBIN: 12.3
EXT LYMPH%: 17
EXT MONOCYTES%: 7.5
EXT PLATELETS: 175
EXT POTASSIUM: 3.8
EXT PROTEIN TOTAL: 5.3
EXT SEGS%: 69.1
EXT SODIUM: 142 MMOL/L
EXT TACROLIMUS LVL: 4.5
EXT WBC: 6.8

## 2023-05-25 RX ORDER — ALLOPURINOL 300 MG/1
TABLET ORAL
Qty: 90 TABLET | Refills: 3 | Status: SHIPPED | OUTPATIENT
Start: 2023-05-25

## 2023-05-26 ENCOUNTER — TELEPHONE (OUTPATIENT)
Dept: TRANSPLANT | Facility: CLINIC | Age: 68
End: 2023-05-26
Payer: MEDICARE

## 2023-05-26 NOTE — TELEPHONE ENCOUNTER
Letter sent, labs stable and no medication changes are needed. Repeat labs due 11/30/23 per protocol.  ----- Message from Jeremiah Contreras MD sent at 5/26/2023  1:29 PM CDT -----  Results reviewed

## 2023-05-26 NOTE — LETTER
May 26, 2023    Елена Atkinson  [address] [facility] [place] [address]          Dear Елена Atkinson:  MRN: [id number]    This is a follow up to your recent labs, your lab results were stable.  There are no medicine changes.  Please have your labs drawn again in 6 months - 11/30/23.      If you cannot have your labs drawn on the scheduled date, it is your responsibility to call the transplant department to reschedule.  Please call [phone] and ask to speak to [facility]  for all scheduling requests.     Sincerely,      Kadi [facility]  [facility] [place] [address]  [phone]

## 2023-06-20 RX ORDER — LEVOTHYROXINE SODIUM 75 UG/1
TABLET ORAL
Qty: 90 TABLET | Refills: 3 | Status: SHIPPED | OUTPATIENT
Start: 2023-06-20

## 2023-06-22 ENCOUNTER — PATIENT OUTREACH (OUTPATIENT)
Dept: ADMINISTRATIVE | Facility: HOSPITAL | Age: 68
End: 2023-06-22
Payer: MEDICARE

## 2023-06-22 ENCOUNTER — PATIENT MESSAGE (OUTPATIENT)
Dept: ADMINISTRATIVE | Facility: HOSPITAL | Age: 68
End: 2023-06-22
Payer: MEDICARE

## 2023-06-22 NOTE — PROGRESS NOTES
Population Health Chart Review & Patient Outreach Details:     Reason for Outreach Encounter:     [x]  Non-Compliant Report   []  Payor Report ([employer], PHN, BCBS, MSSP, MCIP, C, etc.)   []  Pre-Visit Chart Review     Updates Requested / Reviewed:     [x]  Care Everywhere    [x]     []  External Sources ([facility], [facility], DIS, etc.)   []  Care Team Updated    Patient Outreach Method:    [x]  Telephone Outreach Completed   [] Successful   [] Left Voicemail   [x] Unable to Contact (wrong number, no voicemail)  [x]  [email or web address] Outreach Sent  []  Letter Outreach Mailed  []  Fax Sent for External Records  []  External Records Upload    Health Maintenance Topics Addressed and Outreach Outcomes / Actions Taken:        [x]      Breast Cancer Screening []  Mammo Scheduled      []  External Records Requested     []  Added Reminder to Complete to Upcoming Primary Care Appt Notes     []  Patient Declined     []  Patient Will Call Back to Schedule     []  Patient Will Schedule with External Provider / Order Routed if Applicable             []       Cervical Cancer Screening []  Pap Scheduled      []  External Records Requested     []  Added Reminder to Complete to Upcoming Primary Care Appt Notes     []  Patient Declined     []  Patient Will Call Back to Schedule     []  Patient Will Schedule with External Provider               []          Colorectal Cancer Screening []  Colonoscopy Case Request or Referral Placed     []  External Records Requested     []  Added Reminder to Complete to Upcoming Primary Care Appt Notes     []  Patient Declined     []  Patient Will Call Back to Schedule     []  Patient Will Schedule with External Provider     []  Fit Kit Mailed (add the SmartPhrase under additional notes)     []  Reminded Patient to Complete Home Test             []      Diabetic Eye Exam []  Eye Camera Scheduled or Optometry Referral Placed     []  External Records Requested     []  Added Reminder to Complete  to Upcoming Primary Care Appt Notes     []  Patient Declined     []  Patient Will Call Back to Schedule     []  Patient Will Schedule with External Provider             []      Blood Pressure Control []  Primary Care Follow Up Visit Scheduled     []  Remote Blood Pressure Reading Captured     []  Added Reminder to Complete to Upcoming Primary Care Appt Notes     []  Patient Declined     []  Patient Will Call Back / Patient Will Send Portal Message with Reading     []  Patient Will Call Back to Schedule Provider Visit             []       HbA1c & Other Labs []  Lab Appt Scheduled for Due Labs     []  Primary Care Follow Up Visit Scheduled      []  Reminded Patient to Complete Home Test     []  Added Reminder to Complete to Upcoming Primary Care Appt Notes     []  Patient Declined     []  Patient Will Call Back to Schedule     []  Patient Will Schedule with External Provider / Order Routed if Applicable           []    Schedule Primary Care Appt []  Primary Care Appt Scheduled     []  Patient Declined     []  Patient Will Call Back to Schedule     []  Pt Established with External Provider & Updated Care Team             []      Medication Adherence []  Primary Care Appointment Scheduled     []  Added Reminder to Upcoming Primary Care Appt Notes     []  Patient Reminded to  Prescription     []  Patient Declined, Provider Notified if Needed     []  Sent Provider Message to Review and/or Add Exclusion to Problem List             []      Osteoporosis Screening []  DXA Appointment Scheduled     []  External Records Requested     []  Added Reminder to Complete to Upcoming Primary Care Appt Notes     []  Patient Declined     []  Patient Will Call Back to Schedule     []  Patient Will Schedule with External Provider / Order Routed if Applicable     Additional Care Coordinator Notes:         Further Action Needed If Patient Returns Outreach:

## 2023-07-07 RX ORDER — HYDRALAZINE HYDROCHLORIDE 25 MG/1
TABLET, FILM COATED ORAL
Qty: 180 TABLET | Refills: 3 | Status: SHIPPED | OUTPATIENT
Start: 2023-07-07

## 2023-07-07 RX ORDER — ESCITALOPRAM OXALATE 10 MG/1
TABLET ORAL
Qty: 90 TABLET | Refills: 3 | Status: SHIPPED | OUTPATIENT
Start: 2023-07-07

## 2023-07-07 NOTE — TELEPHONE ENCOUNTER
Shaquille Rene,  Please review medication refill request for this patient of Dr. Alonso in his absence.  LOV: 1/3/2023  Thank you,  Signed:  Thalia Cross LPN  Rx Auth Request  Received: Yesterday  Arely Iyer In  DEVIKA Archer Staff  Caller: Unspecified (Yesterday,  9:02 PM)

## 2023-08-15 DIAGNOSIS — Z94.4 LIVER REPLACED BY TRANSPLANT: Primary | ICD-10-CM

## 2023-08-15 RX ORDER — MYCOPHENOLATE MOFETIL 250 MG/1
750 CAPSULE ORAL 2 TIMES DAILY
Qty: 180 CAPSULE | Refills: 11 | Status: SHIPPED | OUTPATIENT
Start: 2023-08-15 | End: 2023-08-16 | Stop reason: SDUPTHER

## 2023-08-16 DIAGNOSIS — Z94.4 LIVER REPLACED BY TRANSPLANT: ICD-10-CM

## 2023-08-16 RX ORDER — MYCOPHENOLATE MOFETIL 250 MG/1
750 CAPSULE ORAL 2 TIMES DAILY
Qty: 180 CAPSULE | Refills: 11 | Status: SHIPPED | OUTPATIENT
Start: 2023-08-16

## 2023-08-24 RX ORDER — TEMAZEPAM 15 MG/1
CAPSULE ORAL
Qty: 30 CAPSULE | Refills: 2 | Status: SHIPPED | OUTPATIENT
Start: 2023-08-24

## 2023-08-24 RX ORDER — METFORMIN HYDROCHLORIDE 500 MG/1
500 TABLET, EXTENDED RELEASE ORAL 2 TIMES DAILY
Qty: 180 TABLET | Refills: 3 | Status: SHIPPED | OUTPATIENT
Start: 2023-08-24

## 2023-08-29 ENCOUNTER — OFFICE VISIT (OUTPATIENT)
Dept: FAMILY MEDICINE | Facility: CLINIC | Age: 68
End: 2023-08-29
Payer: MEDICARE

## 2023-08-29 VITALS
SYSTOLIC BLOOD PRESSURE: 126 MMHG | DIASTOLIC BLOOD PRESSURE: 84 MMHG | TEMPERATURE: 98 F | OXYGEN SATURATION: 96 % | WEIGHT: 189 LBS | BODY MASS INDEX: 33.49 KG/M2 | HEART RATE: 100 BPM | HEIGHT: 63 IN

## 2023-08-29 DIAGNOSIS — Z79.4 TYPE 2 DIABETES MELLITUS WITHOUT COMPLICATION, WITH LONG-TERM CURRENT USE OF INSULIN: ICD-10-CM

## 2023-08-29 DIAGNOSIS — E11.9 TYPE 2 DIABETES MELLITUS WITHOUT COMPLICATION, WITH LONG-TERM CURRENT USE OF INSULIN: ICD-10-CM

## 2023-08-29 DIAGNOSIS — Z94.0 KIDNEY TRANSPLANT STATUS, LIVING RELATED DONOR: ICD-10-CM

## 2023-08-29 DIAGNOSIS — E83.42 HYPOMAGNESEMIA: ICD-10-CM

## 2023-08-29 DIAGNOSIS — Z94.4 LIVER REPLACED BY TRANSPLANT: Primary | ICD-10-CM

## 2023-08-29 DIAGNOSIS — I12.9 HYPERTENSION, RENAL: ICD-10-CM

## 2023-08-29 PROCEDURE — 83036 HEMOGLOBIN GLYCOSYLATED A1C: CPT | Performed by: FAMILY MEDICINE

## 2023-08-29 PROCEDURE — 99214 PR OFFICE/OUTPT VISIT, EST, LEVL IV, 30-39 MIN: ICD-10-PCS | Mod: S$GLB,,, | Performed by: FAMILY MEDICINE

## 2023-08-29 PROCEDURE — 99214 OFFICE O/P EST MOD 30 MIN: CPT | Mod: S$GLB,,, | Performed by: FAMILY MEDICINE

## 2023-08-29 RX ORDER — ERGOCALCIFEROL 1.25 MG/1
CAPSULE ORAL
COMMUNITY
Start: 2023-07-28

## 2023-08-29 RX ORDER — HYDROCODONE BITARTRATE AND ACETAMINOPHEN 5; 325 MG/1; MG/1
1 TABLET ORAL
COMMUNITY
Start: 2023-07-24

## 2023-08-29 RX ORDER — LOSARTAN POTASSIUM 100 MG/1
100 TABLET ORAL
COMMUNITY
Start: 2023-07-28

## 2023-08-29 RX ORDER — INSULIN DEGLUDEC 100 U/ML
INJECTION, SOLUTION SUBCUTANEOUS
COMMUNITY
Start: 2023-07-28

## 2023-08-29 RX ORDER — SEMAGLUTIDE 1.34 MG/ML
1 INJECTION, SOLUTION SUBCUTANEOUS
Qty: 3 ML | Refills: 11 | Status: SHIPPED | OUTPATIENT
Start: 2023-08-29 | End: 2024-08-28

## 2023-08-29 NOTE — PROGRESS NOTES
Subjective:       Patient ID: Елена Atkinson is a 68 y.o. female.    Chief Complaint: Medication Refill and Follow-up      Review of patient's allergies indicates:   Allergen Reactions    Lisinopril      Other reaction(s): Cough    Morphine      Other reaction(s): Itching, Other (See Comments)  Stomach cramps        Medication Refill  Pertinent negatives include no abdominal pain, chest pain, chills, fatigue, fever, headaches, joint swelling, nausea, rash, sore throat, vertigo or weakness.   Follow-up  Pertinent negatives include no abdominal pain, chest pain, chills, fatigue, fever, headaches, joint swelling, nausea, rash, sore throat, vertigo or weakness.     Review of Systems   Constitutional:  Negative for chills, fatigue, fever and unexpected weight change.   HENT:  Negative for ear pain, rhinorrhea, sinus pressure/congestion, sneezing and sore throat.    Eyes:  Negative for photophobia and pain.   Respiratory:  Negative for chest tightness, shortness of breath and wheezing.    Cardiovascular:  Negative for chest pain.   Gastrointestinal:  Negative for abdominal distention, abdominal pain, constipation, diarrhea and nausea.   Endocrine: Negative for polydipsia, polyphagia and polyuria.   Genitourinary:  Negative for dysuria, frequency, menstrual problem, pelvic pain and urgency.   Musculoskeletal:  Negative for back pain and joint swelling.   Integumentary:  Negative for rash, wound, breast mass and breast discharge.   Allergic/Immunologic: Negative for immunocompromised state.   Neurological:  Negative for dizziness, vertigo, weakness and headaches.   Psychiatric/Behavioral:  Negative for agitation, dysphoric mood and sleep disturbance.    All other systems reviewed and are negative.  Breast: Negative for mass        Objective:      Vitals:    08/29/23 1419   BP: 126/84   Pulse: 100   Temp: 97.7 °F (36.5 °C)     Physical Exam  Vitals and nursing note reviewed.   Constitutional:       Appearance: Normal  appearance.   HENT:      Head: Normocephalic.      Right Ear: Tympanic membrane normal.      Left Ear: Tympanic membrane normal.      Nose: Nose normal.      Mouth/Throat:      Mouth: Mucous membranes are moist.   Eyes:      Pupils: Pupils are equal, round, and reactive to light.   Cardiovascular:      Rate and Rhythm: Normal rate and regular rhythm.   Pulmonary:      Effort: Pulmonary effort is normal.   Abdominal:      General: Abdomen is flat. Bowel sounds are normal.      Palpations: Abdomen is soft.   Musculoskeletal:         General: Normal range of motion.   Skin:     General: Skin is warm and dry.   Neurological:      General: No focal deficit present.      Mental Status: She is alert.   Psychiatric:         Mood and Affect: Mood normal.         Behavior: Behavior normal.         Assessment:       1. 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 Type 2 diabetes mellitus without complication, with long-term current use of insulin    3. Kidney transplant status, living related donor 8/19/2009    4. Hypertension, renal        Plan:       Liver replaced by transplant    Type 2 diabetes mellitus without complication, with long-term current use of insulin    Kidney transplant status, living related donor 8/19/2009    Hypertension, renal         Stop glucophage check Hgb A1C  Follow up in about 3 months (around 11/29/2023).

## 2023-08-30 LAB
ESTIMATED AVG GLUCOSE: 169 MG/DL (ref 68–131)
HBA1C MFR BLD: 7.5 % (ref 4–5.6)

## 2023-08-31 ENCOUNTER — CLINICAL SUPPORT (OUTPATIENT)
Dept: FAMILY MEDICINE | Facility: CLINIC | Age: 68
End: 2023-08-31
Payer: MEDICARE

## 2023-08-31 DIAGNOSIS — E87.6 LOW BLOOD POTASSIUM: ICD-10-CM

## 2023-08-31 DIAGNOSIS — E87.5 SERUM POTASSIUM ELEVATED: Primary | ICD-10-CM

## 2023-08-31 PROCEDURE — 80053 COMPREHEN METABOLIC PANEL: CPT | Performed by: FAMILY MEDICINE

## 2023-09-01 LAB
ALBUMIN SERPL BCP-MCNC: 2.9 G/DL (ref 3.5–5.2)
ALP SERPL-CCNC: 66 U/L (ref 55–135)
ALT SERPL W/O P-5'-P-CCNC: 24 U/L (ref 10–44)
ANION GAP SERPL CALC-SCNC: 8 MMOL/L (ref 8–16)
AST SERPL-CCNC: 22 U/L (ref 10–40)
BILIRUB SERPL-MCNC: 0.5 MG/DL (ref 0.1–1)
BUN SERPL-MCNC: 14 MG/DL (ref 8–23)
CALCIUM SERPL-MCNC: 8.9 MG/DL (ref 8.7–10.5)
CHLORIDE SERPL-SCNC: 105 MMOL/L (ref 95–110)
CO2 SERPL-SCNC: 26 MMOL/L (ref 23–29)
CREAT SERPL-MCNC: 0.8 MG/DL (ref 0.5–1.4)
EST. GFR  (NO RACE VARIABLE): >60 ML/MIN/1.73 M^2
GLUCOSE SERPL-MCNC: 167 MG/DL (ref 70–110)
POTASSIUM SERPL-SCNC: 4.6 MMOL/L (ref 3.5–5.1)
PROT SERPL-MCNC: 5.4 G/DL (ref 6–8.4)
SODIUM SERPL-SCNC: 139 MMOL/L (ref 136–145)

## 2023-09-11 ENCOUNTER — TELEPHONE (OUTPATIENT)
Dept: FAMILY MEDICINE | Facility: CLINIC | Age: 68
End: 2023-09-11
Payer: MEDICARE

## 2023-09-11 NOTE — TELEPHONE ENCOUNTER
Call placed to pt due to lab results and Dr. Alonso's orders. Patient currently not available msg left for return call.    ----- Message from Gianni Alonso MD sent at 8/31/2023 12:58 PM CDT -----  Hgb A1C is up to 7.5 advise low carb diet increase fiber intake recheck Hgb A1C in 3 mths  ----- Message -----  From: [facility]  Sent: 8/30/2023   2:03 PM CDT  To: Gianni Alonso MD

## 2023-09-12 ENCOUNTER — TELEPHONE (OUTPATIENT)
Dept: FAMILY MEDICINE | Facility: CLINIC | Age: 68
End: 2023-09-12
Payer: MEDICARE

## 2023-09-12 NOTE — TELEPHONE ENCOUNTER
----- Message from Gianni Alonso MD sent at 8/31/2023 12:58 PM CDT -----  Hgb A1C is up to 7.5 advise low carb diet increase fiber intake recheck Hgb A1C in 3 mths  ----- Message -----  From: [facility] [employer] [facility] Interface  Sent: 8/30/2023   2:03 PM CDT  To: Gianni Alonso MD

## 2023-09-12 NOTE — TELEPHONE ENCOUNTER
----- Message from Nara Patrick sent at 9/12/2023  2:43 PM CDT -----  Contact: self  Type:  Patient Returning Call    Who Called:  patient   Who Left Message for Patient:  Thalia   Does the patient know what this is regarding?:  results   Best Call Back Number:  [phone]  Additional Information:  Patient does not turn on her phone until 11 AM each morning so please call after that time.  She is a little concerned about the results because you said you would discuss her treatment plans, has her concerned.  thanks

## 2023-09-12 NOTE — TELEPHONE ENCOUNTER
Received incoming call from pt due to messages left. Spoke w/pt informed her of lab results and Dr. Prater orders that Hgb A1c is up to 7.5 and advise low carb diet increase fiber intake/recheck Hgb A1c in 3 mths. States will do.    ----- Message from Gianni Alonso MD sent at 8/31/2023 12:58 PM CDT -----  Hgb A1C is up to 7.5 advise low carb diet increase fiber intake recheck Hgb A1C in 3 mths  ----- Message -----  From: [facility] Interface  Sent: 8/30/2023   2:03 PM CDT  To: Gianni Alonso MD

## 2023-09-12 NOTE — TELEPHONE ENCOUNTER
Call placed to pt due to message left, currently not available messge left for return call.   ----- Message from Katiana Figueroa sent at 9/11/2023 10:04 AM CDT -----  Contact: pt  Type: Needs Medical Advice         Who Called: pt  Best Call Back Number:[phone]  Additional Information: Requesting a call back regarding  pt asking for you to call her back after 11am only   Please Advise- Thank you

## 2023-09-12 NOTE — TELEPHONE ENCOUNTER
----- Message from Gianni Alonso MD sent at 8/31/2023 12:58 PM CDT -----  Hgb A1C is up to 7.5 advise low carb diet increase fiber intake recheck Hgb A1C in 3 mths  ----- Message -----  From: [facility] [employer] [facility] Interface  Sent: 8/30/2023   2:03 PM CDT  To: Gianni Alonso MD

## 2023-09-25 RX ORDER — TACROLIMUS 0.5 MG/1
0.5 CAPSULE ORAL
Qty: 60 CAPSULE | Refills: 0 | Status: SHIPPED | OUTPATIENT
Start: 2023-09-25

## 2023-10-16 ENCOUNTER — TELEPHONE (OUTPATIENT)
Dept: FAMILY MEDICINE | Facility: CLINIC | Age: 68
End: 2023-10-16

## 2023-10-16 NOTE — TELEPHONE ENCOUNTER
Hello [place],   Please review message below.  Call placed to pt due to msg left, spoke w/pt states sent paperwork for handicap decal. Call for status update for completion.  Thank you.  Signed:  Thalia Cross LPN    ----- Message from Alcira Amaya sent at 10/16/2023  2:08 PM CDT -----  Type: Needs Medical Advice  Who Called:  pt  Symptoms (please be specific):  pt said she need to speak to the nurse about some paper work that she need signed--said she mailed it to the office and she have not heard back--please call and advise  Best Call Back Number: [phone] (home)     Additional Information: thank you

## 2023-12-18 RX ORDER — INSULIN DEGLUDEC 100 U/ML
INJECTION, SOLUTION SUBCUTANEOUS
Qty: 45 ML | Refills: 3 | Status: SHIPPED | OUTPATIENT
Start: 2023-12-18

## 2024-04-25 ENCOUNTER — PATIENT MESSAGE (OUTPATIENT)
Dept: TRANSPLANT | Facility: CLINIC | Age: 69
End: 2024-04-25
Payer: MEDICARE

## 2024-04-30 LAB
EXT ALBUMIN: 2.5
EXT ALKALINE PHOSPHATASE: 75
EXT ALT: 25
EXT AST: 35
EXT BASOPHIL%: 0.5
EXT BILIRUBIN TOTAL: 0.4
EXT BUN: 13
EXT CALCIUM: 8.9
EXT CHLORIDE: 111
EXT CO2: 29
EXT CREATININE: 0.64 MG/DL
EXT EOSINOPHIL%: 4.3
EXT GLUCOSE: 75
EXT HEMATOCRIT: 34.2
EXT HEMOGLOBIN: 11.2
EXT LYMPH%: 14.5
EXT MONOCYTES%: 8.2
EXT PLATELETS: 197
EXT POTASSIUM: 4
EXT PROTEIN TOTAL: 4.1
EXT SEGS%: 72
EXT SODIUM: 143 MMOL/L
EXT TACROLIMUS LVL: 4.6
EXT WBC: 6.5

## 2024-05-03 ENCOUNTER — TELEPHONE (OUTPATIENT)
Dept: TRANSPLANT | Facility: CLINIC | Age: 69
End: 2024-05-03
Payer: MEDICARE

## 2024-05-03 ENCOUNTER — PATIENT MESSAGE (OUTPATIENT)
Dept: TRANSPLANT | Facility: CLINIC | Age: 69
End: 2024-05-03
Payer: MEDICARE

## 2024-05-03 NOTE — TELEPHONE ENCOUNTER
Labs reviewed via portal and are stable.  Patient will repeat labs on 10/28//24.      ----- Message from Jeremiah Contreras MD sent at 5/1/2024 11:33 AM CDT -----  Results reviewed

## 2024-07-17 ENCOUNTER — PATIENT MESSAGE (OUTPATIENT)
Dept: TRANSPLANT | Facility: CLINIC | Age: 69
End: 2024-07-17
Payer: MEDICARE

## 2024-07-18 LAB
EXT ALBUMIN: 2.5
EXT ALKALINE PHOSPHATASE: 75
EXT ALT: 25
EXT AST: 35
EXT BASOPHIL%: 0.5
EXT BILIRUBIN TOTAL: 0.4
EXT BUN: 13
EXT CALCIUM: 8.9
EXT CHLORIDE: 111
EXT CO2: 29
EXT CREATININE: 0.64 MG/DL
EXT EGFR NO RACE VARIABLE: >90
EXT EOSINOPHIL%: 4.3
EXT GLUCOSE: 75
EXT HEMATOCRIT: 34.2
EXT HEMOGLOBIN: 11.2
EXT LYMPH%: 14.5
EXT MONOCYTES%: 8.2
EXT PLATELETS: 197
EXT POTASSIUM: 4
EXT PROTEIN TOTAL: 4.1
EXT SEGS%: 72
EXT SODIUM: 143 MMOL/L
EXT TACROLIMUS LVL: 4.6
EXT WBC: 6.5

## 2024-07-24 ENCOUNTER — TELEPHONE (OUTPATIENT)
Dept: TRANSPLANT | Facility: CLINIC | Age: 69
End: 2024-07-24
Payer: MEDICARE

## 2024-07-24 NOTE — TELEPHONE ENCOUNTER
Labs previously reviewed with no changes.  Will repeat labs  10/28/24.      ----- Message from Jeremiah Contreras MD sent at 7/23/2024  3:38 PM CDT -----  Results reviewed

## 2024-08-08 DIAGNOSIS — Z94.4 LIVER REPLACED BY TRANSPLANT: Primary | ICD-10-CM

## 2024-08-08 RX ORDER — TACROLIMUS 0.5 MG/1
0.5 CAPSULE ORAL EVERY 12 HOURS
Qty: 60 CAPSULE | Refills: 11 | Status: SHIPPED | OUTPATIENT
Start: 2024-08-08

## 2024-08-13 LAB
EXT ABO: NORMAL
EXT AFP: NORMAL
EXT ALBUMIN: 2.5
EXT ALCOHOL, MEDICAL, SERUM: NORMAL
EXT ALKALINE PHOSPHATASE: 85
EXT ALT: 56
EXT AMMONIA LEVEL: NORMAL
EXT AMYLASE: NORMAL
EXT AST: 70
EXT BACTERIA UA: NORMAL
EXT BASOPHIL%: 0.9
EXT BILIRUBIN DIRECT: NORMAL
EXT BILIRUBIN TOTAL: 0.4
EXT BK VIRUS DNA QN PCR: NORMAL
EXT BK VIRUS DNA QUANT, PCR, URINE: NORMAL
EXT BLOOD (STOOL): NORMAL
EXT BLOOD CULTURE, ROUTINE: NORMAL
EXT BLOOD CULTURE: NORMAL
EXT BLOOD FUNGUS: NORMAL
EXT BLOOD WITH ANAEROBE: NORMAL
EXT BUN: 16
EXT CALCIUM: 8.4
EXT CDIFF (STOOL): NORMAL
EXT CHLORIDE: 110
EXT CHOLESTEROL: NORMAL
EXT CMV ANTIGENEMIA: NORMAL
EXT CMV DNA QUANT. BY PCR: NORMAL
EXT CO2: 29
EXT CREATININE UA: NORMAL
EXT CREATININE: 0.74 MG/DL
EXT CULTURE (STOOL): NORMAL
EXT CULTURE, BODY FLUID - BACTEC: NORMAL
EXT CYCLOSPORONE LEVEL: NORMAL
EXT DRUGS OF ABUSE CONFIRMATION: NORMAL
EXT EBV DNA BY PCR: NORMAL
EXT EBV DNA-COPIES/ML: NORMAL
EXT EBV IGG: NORMAL
EXT EBV IGM: NORMAL
EXT EGFR NO RACE VARIABLE: NORMAL
EXT EOSINOPHIL%: 2.4
EXT ERYTHROPOIETIN: NORMAL
EXT FERRITIN: NORMAL
EXT FOLATE: NORMAL
EXT FUNGAL (STOOL): NORMAL
EXT G6PD QUAL: NORMAL
EXT GFR MDRD AF AMER: NORMAL
EXT GFR MDRD NON AF AMER: 88
EXT GGT: NORMAL
EXT GLUCOSE: 88
EXT HBV DNA QUANT PCR: NORMAL
EXT HBV DNA, QUALITATIVE PCR: NORMAL
EXT HCV QUANT: NORMAL
EXT HDL: NORMAL
EXT HEMATOCRIT: 31.6
EXT HEMOGLOBIN A1C: NORMAL
EXT HEMOGLOBIN: 10
EXT HEP B S AB: NORMAL
EXT HEP B S AG: NORMAL
EXT HIV RNA QUANT PCR: NORMAL
EXT HIV: NORMAL
EXT IMMUNKNOW (NON-STIMULATED): NORMAL
EXT IMMUNKNOW (STIMULATED): NORMAL
EXT INR: NORMAL
EXT IRON SATURATION: NORMAL
EXT LDL CHOLESTEROL: NORMAL
EXT LIPASE: NORMAL
EXT LYMPH%: 22.3
EXT MAGNESIUM: NORMAL
EXT MONOCYTES%: 8.5
EXT O&P (STOOL): NORMAL
EXT PHOSPHORUS: NORMAL
EXT PLATELETS: 195
EXT POTASSIUM: 4.7
EXT PREALBUMIN: NORMAL
EXT PROT/CREAT RATIO UR: NORMAL
EXT PROTEIN TOTAL: 4.1
EXT PROTEIN UA: NORMAL
EXT PT: NORMAL
EXT PTH, INTACT: NORMAL
EXT PTT: NORMAL
EXT RBC UA: NORMAL
EXT RETICULOCYTE COUNT: NORMAL
EXT SARS COV-2 (COVID-19): NORMAL
EXT SEGS%: 64.6
EXT SIROLIMUS LVL: NORMAL
EXT SODIUM: 142 MMOL/L
EXT TACROLIMUS LVL: NORMAL
EXT TIBC: NORMAL
EXT TRIGLYCERIDES: NORMAL
EXT UNSATURATED IRON BINDING CAP.: NORMAL
EXT URIC ACID: NORMAL
EXT VIT D 1 25 DIHYDROXY: NORMAL
EXT VIT D 25 HYDROXY: NORMAL
EXT WBC (STOOL): NORMAL
EXT WBC UA: NORMAL
EXT WBC: 6.7

## 2024-08-21 ENCOUNTER — TELEPHONE (OUTPATIENT)
Dept: TRANSPLANT | Facility: CLINIC | Age: 69
End: 2024-08-21
Payer: MEDICARE

## 2024-08-21 ENCOUNTER — PATIENT MESSAGE (OUTPATIENT)
Dept: TRANSPLANT | Facility: CLINIC | Age: 69
End: 2024-08-21
Payer: MEDICARE

## 2024-08-21 LAB
EXT ALBUMIN: 2.7
EXT ALKALINE PHOSPHATASE: 92
EXT ALT: 76
EXT AST: 80
EXT BASOPHIL%: 0.4
EXT BILIRUBIN TOTAL: 0.4
EXT BUN: 11
EXT CALCIUM: 8.2
EXT CHLORIDE: 112
EXT CO2: 27
EXT CREATININE: 0.57 MG/DL
EXT EOSINOPHIL%: 2.7
EXT GFR MDRD NON AF AMER: >90
EXT GLUCOSE: 173
EXT HEMATOCRIT: 32.4
EXT HEMOGLOBIN: 10
EXT LYMPH%: 16.4
EXT MONOCYTES%: 6.4
EXT PLATELETS: 121
EXT POTASSIUM: 4.5
EXT PROTEIN TOTAL: 4.3
EXT SEGS%: 73.3
EXT SODIUM: 143 MMOL/L
EXT TACROLIMUS LVL: ABNORMAL
EXT WBC: 5.2

## 2024-08-21 NOTE — TELEPHONE ENCOUNTER
Called patient, no answer, left a VM to call back or answer the [employer] message.  Repet labs 8/26/24----- Message from Jeremiah Contreras MD sent at 8/20/2024  2:52 PM CDT -----  Her liver numbers are high. Has she been unwell?  Repeat in 1 week  Results reviewed

## 2024-08-21 NOTE — TELEPHONE ENCOUNTER
Returned call, patient was admitted recently with a UTI and treated with 12 days of antibiotics.  She also stated she did miss a couple of doses of her Prograf related to not able to get medication refill.  Her PCP, Dr. Alonso  ([phone]) has suggested she has been on tacrolimus for too long and would like to discuss with dr. Contreras.  Her symptoms are confusion, memory loss, and gait issues.  Message has been sent to Dr. Contreras with these concerns. ----- Message from Lala Tobar MA sent at 8/21/2024  2:56 PM CDT -----  Regarding: FW: Missed Call  Contact: IVIS AREVALO [[id number]]    ----- Message -----  From: Wandy Zepeda  Sent: 8/21/2024   1:20 PM CDT  To: [facility] Post-Liver Transplant Non-Clinical  Subject: Missed Call                                      Returning a Missed Call         Caller:  IVIS AREVALO [[id number]]           Returning call to:  Laurence GARCIA           Caller can be reached @: [phone] (home)            Nature of the call:  Missed Call

## 2024-08-21 NOTE — LETTER
"   One time LAB ORDERS    2024 repeat labs week of 24      ORDERING MD:      Jeremiah Contreras [facility], FRCP ([place]), MPH  Associate Professor of Medicine, [employer] [facility], [place], [place] and  Medical Director Organ Transplant [place]   [facility]        Patient Name: Елена Atkinson               : 1955    [facility] Number: [id number]                         Please draw the following labs:     Test Frequency  Diagnosis/ICD-10 Code     CBC/DIFF/PLT once  Z94.4 Liver Transplant       Complete Metabolic Panel once  Z94.4 Liver Transplant         Prograf level once  Z94.4 Liver Transplant               FAX RESULTS [phone] "Attention Liver Transplant Coordinator", and send the hard copy when completed. If you have any questions regarding this request or need additional information, please call [phone].        "

## 2024-08-26 ENCOUNTER — TELEPHONE (OUTPATIENT)
Dept: TRANSPLANT | Facility: CLINIC | Age: 69
End: 2024-08-26
Payer: MEDICARE

## 2024-08-26 NOTE — TELEPHONE ENCOUNTER
Copy of lab orders sent to patient via portal and copy faxed per pt's request below.    ----- Message from Rosetta Castellano MA sent at 8/26/2024  2:58 PM CDT -----  Regarding: FW: Orders    ----- Message -----  From: Lara Troy  Sent: 8/26/2024   2:53 PM CDT  To: [facility] Post-Liver Transplant Non-Clinical  Subject: Orders                                                 Name Of Caller:   Елена      Contact Preference:  [phone]       Nature of call:   Pt requesting orders for labs sent to [facility] in [place]. Please send to their fax #: [phone] or contact phone #: [phone].

## 2024-08-27 ENCOUNTER — TELEPHONE (OUTPATIENT)
Dept: TRANSPLANT | Facility: CLINIC | Age: 69
End: 2024-08-27
Payer: MEDICARE

## 2024-08-27 NOTE — TELEPHONE ENCOUNTER
Current standing lab orders faxed to [facility] @ [phone].  Confirmed fax # with [facility].

## 2024-08-29 ENCOUNTER — TELEPHONE (OUTPATIENT)
Dept: TRANSPLANT | Facility: CLINIC | Age: 69
End: 2024-08-29
Payer: MEDICARE

## 2024-08-29 NOTE — TELEPHONE ENCOUNTER
Labs reviewed via portal.  Instructed to repeat labs next week.    ----- Message from Jeremiah Contreras MD sent at 8/27/2024  2:53 PM CDT -----  Repeat in 1-2 weeks  Results reviewed

## 2024-09-04 LAB
EXT ALBUMIN: 3.2
EXT ALKALINE PHOSPHATASE: 94
EXT ALT: 22
EXT AST: 28
EXT BASOPHIL%: 0.6
EXT BILIRUBIN TOTAL: 0.6
EXT BUN: 12
EXT CALCIUM: 9.3
EXT CHLORIDE: 112
EXT CO2: 24
EXT CREATININE: 0.66 MG/DL
EXT EGFR NO RACE VARIABLE: >90
EXT EOSINOPHIL%: 5.9
EXT GLUCOSE: 138
EXT HEMATOCRIT: 34.5
EXT HEMOGLOBIN: 11.2
EXT LYMPH%: 16.7
EXT MONOCYTES%: 10
EXT PLATELETS: 164
EXT POTASSIUM: 4.1
EXT PROTEIN TOTAL: 5
EXT SEGS%: 66.1
EXT SODIUM: 143 MMOL/L
EXT TACROLIMUS LVL: 4.7
EXT WBC: 5.4

## 2024-09-06 LAB
EXT ABO: NORMAL
EXT AFP: NORMAL
EXT ALBUMIN: 3.5
EXT ALCOHOL, MEDICAL, SERUM: NORMAL
EXT ALKALINE PHOSPHATASE: 89
EXT ALT: 17
EXT AMMONIA LEVEL: NORMAL
EXT AMYLASE: NORMAL
EXT AST: 27
EXT BACTERIA UA: NORMAL
EXT BASOPHIL%: 0.8
EXT BILIRUBIN DIRECT: NORMAL
EXT BILIRUBIN TOTAL: 0.8
EXT BK VIRUS DNA QN PCR: NORMAL
EXT BK VIRUS DNA QUANT, PCR, URINE: NORMAL
EXT BLOOD (STOOL): NORMAL
EXT BLOOD CULTURE, ROUTINE: NORMAL
EXT BLOOD CULTURE: NORMAL
EXT BLOOD FUNGUS: NORMAL
EXT BLOOD WITH ANAEROBE: NORMAL
EXT BUN: 15
EXT CALCIUM: 11.4
EXT CDIFF (STOOL): NORMAL
EXT CHLORIDE: 107
EXT CHOLESTEROL: NORMAL
EXT CMV ANTIGENEMIA: NORMAL
EXT CMV DNA QUANT. BY PCR: NORMAL
EXT CO2: 26
EXT CREATININE UA: NORMAL
EXT CREATININE: 0.89 MG/DL
EXT CULTURE (STOOL): NORMAL
EXT CULTURE, BODY FLUID - BACTEC: NORMAL
EXT CYCLOSPORONE LEVEL: NORMAL
EXT DRUGS OF ABUSE CONFIRMATION: NORMAL
EXT EBV DNA BY PCR: NORMAL
EXT EBV DNA-COPIES/ML: NORMAL
EXT EBV IGG: NORMAL
EXT EBV IGM: NORMAL
EXT EGFR NO RACE VARIABLE: NORMAL
EXT EOSINOPHIL%: 3.5
EXT ERYTHROPOIETIN: NORMAL
EXT FERRITIN: NORMAL
EXT FOLATE: NORMAL
EXT FUNGAL (STOOL): NORMAL
EXT G6PD QUAL: NORMAL
EXT GFR MDRD AF AMER: NORMAL
EXT GFR MDRD NON AF AMER: 70
EXT GGT: NORMAL
EXT GLUCOSE: 125
EXT HBV DNA QUANT PCR: NORMAL
EXT HBV DNA, QUALITATIVE PCR: NORMAL
EXT HCV QUANT: NORMAL
EXT HDL: NORMAL
EXT HEMATOCRIT: 38.5
EXT HEMOGLOBIN A1C: NORMAL
EXT HEMOGLOBIN: 12.6
EXT HEP B S AB: NORMAL
EXT HEP B S AG: NORMAL
EXT HIV RNA QUANT PCR: NORMAL
EXT HIV: NORMAL
EXT IMMUNKNOW (NON-STIMULATED): NORMAL
EXT IMMUNKNOW (STIMULATED): NORMAL
EXT INR: NORMAL
EXT IRON SATURATION: NORMAL
EXT LDL CHOLESTEROL: NORMAL
EXT LIPASE: NORMAL
EXT LYMPH%: 18.7
EXT MAGNESIUM: NORMAL
EXT MONOCYTES%: 9.2
EXT O&P (STOOL): NORMAL
EXT PHOSPHORUS: NORMAL
EXT PLATELETS: 197
EXT POTASSIUM: 3.8
EXT PREALBUMIN: NORMAL
EXT PROT/CREAT RATIO UR: NORMAL
EXT PROTEIN TOTAL: 5.4
EXT PROTEIN UA: NORMAL
EXT PT: NORMAL
EXT PTH, INTACT: NORMAL
EXT PTT: NORMAL
EXT RBC UA: NORMAL
EXT RETICULOCYTE COUNT: NORMAL
EXT SARS COV-2 (COVID-19): NORMAL
EXT SEGS%: 67.1
EXT SIROLIMUS LVL: NORMAL
EXT SODIUM: 139 MMOL/L
EXT TACROLIMUS LVL: NORMAL
EXT TIBC: NORMAL
EXT TRIGLYCERIDES: NORMAL
EXT UNSATURATED IRON BINDING CAP.: NORMAL
EXT URIC ACID: NORMAL
EXT VIT D 1 25 DIHYDROXY: NORMAL
EXT VIT D 25 HYDROXY: NORMAL
EXT WBC (STOOL): NORMAL
EXT WBC UA: NORMAL
EXT WBC: 7.2

## 2024-09-09 DIAGNOSIS — Z94.4 LIVER REPLACED BY TRANSPLANT: ICD-10-CM

## 2024-09-18 ENCOUNTER — TELEPHONE (OUTPATIENT)
Dept: TRANSPLANT | Facility: CLINIC | Age: 69
End: 2024-09-18
Payer: MEDICARE

## 2024-09-18 RX ORDER — MYCOPHENOLATE MOFETIL 250 MG/1
750 CAPSULE ORAL 2 TIMES DAILY
Qty: 180 CAPSULE | Refills: 11 | Status: SHIPPED | OUTPATIENT
Start: 2024-09-18

## 2024-09-18 NOTE — LETTER
September 18, 2024    Елена Atkinson  [address] [place] [address]          Dear Еленаaleida Atkinson:  MRN: [id number]    This is a follow up to your recent labs, your lab results were stable.  There are no medicine changes.  Please have your labs drawn again on 3/10/25.      If you cannot have your labs drawn on the scheduled date, it is your responsibility to call the transplant department to reschedule.  Please call [phone] and ask to speak to [facility]  for all scheduling requests.     Sincerely,        Your Liver Transplant Coordinator    [facility]  [address] [place] [id number]  [phone]

## 2024-09-18 NOTE — TELEPHONE ENCOUNTER
Continue routine labs no changes needed.  Letter sent for next lab appointment 3/10/25      ----- Message from Jeremiah Contreras MD sent at 9/17/2024  3:25 PM CDT -----  Results reviewed    
done

## 2024-09-27 ENCOUNTER — OFFICE VISIT (OUTPATIENT)
Dept: TRANSPLANT | Facility: CLINIC | Age: 69
End: 2024-09-27
Payer: MEDICARE

## 2024-09-27 VITALS
OXYGEN SATURATION: 97 % | DIASTOLIC BLOOD PRESSURE: 67 MMHG | HEART RATE: 67 BPM | BODY MASS INDEX: 29.77 KG/M2 | TEMPERATURE: 97 F | HEIGHT: 63 IN | RESPIRATION RATE: 18 BRPM | SYSTOLIC BLOOD PRESSURE: 119 MMHG | WEIGHT: 168 LBS

## 2024-09-27 DIAGNOSIS — Z29.89 PROPHYLACTIC IMMUNOTHERAPY: ICD-10-CM

## 2024-09-27 DIAGNOSIS — E11.9 TYPE 2 DIABETES MELLITUS WITHOUT COMPLICATION, WITH LONG-TERM CURRENT USE OF INSULIN: ICD-10-CM

## 2024-09-27 DIAGNOSIS — Z79.4 TYPE 2 DIABETES MELLITUS WITHOUT COMPLICATION, WITH LONG-TERM CURRENT USE OF INSULIN: ICD-10-CM

## 2024-09-27 DIAGNOSIS — Z94.4 LIVER REPLACED BY TRANSPLANT: Primary | ICD-10-CM

## 2024-09-27 DIAGNOSIS — Z94.0 KIDNEY TRANSPLANT STATUS, LIVING RELATED DONOR: Chronic | ICD-10-CM

## 2024-09-27 PROCEDURE — 99999 PR PBB SHADOW E&M-EST. PATIENT-LVL IV: CPT | Mod: PBBFAC,,, | Performed by: INTERNAL MEDICINE

## 2024-09-27 PROCEDURE — 99214 OFFICE O/P EST MOD 30 MIN: CPT | Mod: PBBFAC | Performed by: INTERNAL MEDICINE

## 2024-09-27 NOTE — PROGRESS NOTES
Subjective:       Patient ID: Елена Atkinson is a 69 y.o. female.    Chief Complaint: No chief complaint on file.    HPI  Ms. Atkinson is a 69 y.o. year old  female who received a  donor liver transplant for HCV cirrhosis at [facility] in 2003.  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      Donor CMV Status: Negative   Recipient CMV Status: Positive     She had mild recurrent HCV infection. Her last liver biopsy was in 2012 but she had an elastography test in 2015.  For Harvoni/riba 24 weeks- completed on 10/2015  SVR 12 on 2016    - 2015 elastography showed F2-3  - G1a    Labs 2024  Currently LFTs/Creat are normal  Last Tac level was 5.4    She tells me that she had an episode of KRISTOFER in 2015 from which she recovered completely.    Last liver biopsy: 2012  NO EVIDENCE OF REJECTION.  CHRONIC HEPATITIS C WITH MILD ACTIVITY  AND RARE APOPTOTIC BODIES (GRADE 1-2 OUT OF 4).  PORTAL FIBROSIS (STAGE 1 OUT OF 4)  NO EVIDENCE OF CIRRHOSIS.  STEATOSIS, LESS THAN 1%.  NO IRON.  IRON AND TRICHROME WITH APPROPRIATE CONTROLS    Abdominal US: 2015  Normal    Body mass index is 29.76 kg/m².      Lab Results   Component Value Date    ALT 24 2023    AST 22 2023     (H) 2011    ALKPHOS 66 2023    BILITOT 0.5 2023     Past Medical History:   Diagnosis Date    Brain TIA 2021    CKD (chronic kidney disease) 2013    ESRD (end stage renal disease) 2007    IgA Nephropathy    Gout     History of HCV, s/p successful Rx w/ SVR - 2016     Liver biopsy 12: Stage 1 fibrosis Completed 24 weeks Harvoni + RBV w/ SVR    Hyperlipidemia     Hypertension, renal     IgA nephropathy     Immunosuppressive management encounter following kidney transplant     Kidney transplant status, living  related donor 08/19/2009    Obesity     Thyroid disease     Type 2 diabetes mellitus      Past Surgical History:   Procedure Laterality Date    CARDIAC SURGERY      CHOLECYSTECTOMY  2005    With liver transplant    FRACTURE SURGERY  2010    Arm broke. Have a zarina    KIDNEY TRANSPLANT      LIVER TRANSPLANT      TUBAL LIGATION       Current Outpatient Medications   Medication Sig    allopurinoL (ZYLOPRIM) 300 MG tablet TAKE 1 TABLET BY MOUTH IN THE  MORNING    atorvastatin (LIPITOR) 20 MG tablet TAKE 1 TABLET BY MOUTH IN THE  EVENING    cyanocobalamin (VITAMIN B-12) 100 MCG tablet Take 100 mcg by mouth after lunch.     EScitalopram oxalate (LEXAPRO) 10 MG tablet TAKE 1 TABLET BY MOUTH ONCE  DAILY (Patient taking differently: Take 20 mg by mouth once daily.)    levothyroxine (SYNTHROID) 75 MCG tablet TAKE 1 TABLET BY MOUTH BEFORE  BREAKFAST    LORazepam (ATIVAN) 0.5 MG tablet Take 0.5 mg by mouth 2 (two) times daily as needed.    losartan (COZAAR) 100 MG tablet Take 100 mg by mouth.    magnesium glycinate 100 mg Tab Take 5 tablets by mouth once daily. 500 mg daily    multivitamin (THERAGRAN) per tablet Take 1 tablet by mouth after lunch.     mycophenolate (CELLCEPT) 250 mg Cap TAKE 3 CAPSULES TWICE DAILY    predniSONE (DELTASONE) 1 MG tablet Take 1 tablet (1 mg total) by mouth 3 (three) times daily. (Patient taking differently: Take 3 mg by mouth once daily.)    semaglutide (OZEMPIC) 1 mg/dose (2 mg/1.5 mL) PnIj Inject 1 mg into the skin every 7 days.    tacrolimus (PROGRAF) 0.5 MG Cap Take 1 capsule (0.5 mg total) by mouth every 12 (twelve) hours.    temazepam (RESTORIL) 15 mg Cap TAKE 1 CAPSULE BY MOUTH DAILY AS NEEDED FOR SLEEP    TRESIBA FLEXTOUCH U-100 100 unit/mL (3 mL) insulin pen Inject into the skin.    TRESIBA FLEXTOUCH U-100 100 unit/mL (3 mL) insulin pen INJECT SUBCUTANEOUSLY 50 UNITS  ONCE DAILY    VITAMIN D2 1,250 mcg (50,000 unit) capsule Take by mouth.    cyclobenzaprine (FLEXERIL) 10 MG tablet Take 10  mg by mouth daily as needed. (Patient not taking: Reported on 9/27/2024)    hydrOXYzine pamoate (VISTARIL) 25 MG Cap Take 25 mg by mouth daily as needed. (Patient not taking: Reported on 9/27/2024)     No current facility-administered medications for this visit.     Review of Systems   Constitutional:  Negative for activity change, appetite change, chills, fatigue, fever and unexpected weight change.   HENT:  Negative for ear pain, hearing loss, nosebleeds, sore throat and trouble swallowing.    Eyes:  Negative for redness and visual disturbance.   Respiratory:  Negative for cough, chest tightness, shortness of breath and wheezing.    Cardiovascular:  Negative for chest pain and palpitations.   Gastrointestinal:  Negative for abdominal distention, abdominal pain, blood in stool, constipation, diarrhea, nausea and vomiting.   Genitourinary:  Negative for difficulty urinating, dysuria, frequency, hematuria and urgency.   Musculoskeletal:  Negative for arthralgias, back pain, gait problem, joint swelling and myalgias.   Skin:  Negative for rash.   Neurological:  Negative for tremors, seizures, speech difficulty, weakness and headaches.   Hematological:  Negative for adenopathy.   Psychiatric/Behavioral:  Negative for confusion, decreased concentration and sleep disturbance. The patient is not nervous/anxious.          Objective:      Physical Exam  Constitutional:       General: She is not in acute distress.     Appearance: She is well-developed.   HENT:      Head: Normocephalic.   Eyes:      Pupils: Pupils are equal, round, and reactive to light.   Neck:      Thyroid: No thyromegaly.      Vascular: No JVD.      Trachea: No tracheal deviation.   Cardiovascular:      Rate and Rhythm: Normal rate and regular rhythm.      Heart sounds: Normal heart sounds. No murmur heard.  Pulmonary:      Effort: Pulmonary effort is normal.      Breath sounds: Normal breath sounds. No stridor.   Abdominal:      Palpations: Abdomen is  soft.   Lymphadenopathy:      Head:      Right side of head: No submental, submandibular, tonsillar, preauricular, posterior auricular or occipital adenopathy.      Left side of head: No submental, submandibular, tonsillar, preauricular, posterior auricular or occipital adenopathy.      Cervical: No cervical adenopathy.   Neurological:      Mental Status: She is alert. She is not disoriented.      Cranial Nerves: No cranial nerve deficit.      Sensory: No sensory deficit.         Assessment:       1. Liver replaced by transplant for HCV 7/2003    2. Prophylactic immunosuppression for kidney transplant    3. Type 2 diabetes mellitus without complication, with long-term current use of insulin    4. Kidney transplant status, living related donor 8/19/2009          Plan:   1. No change in immunosuppression- Tac/MMF 750mg BID and predn 3 mg daily  2. She no longer has any kidney Tx follow up - would like to follow with someone here at [facility].  3. Labs every 3 months  4. Has ongoing dermatology surveillance  5. Clinic in about 1 year    I note her UTI and fall in Aug 2024 that was associated with a mild LFT rise but she has recovered from that.      Functional Status: 100% - Normal, no complaints, no evidence of disease  Physical Capacity: No Limitations

## 2024-09-27 NOTE — LETTER
September 27, 2024        Grayson Hawthorne  [address]  [facility]  [place] [address]  Phone: [phone]  Fax: [phone]     Gianni Alonso  [address]  [place] [address]  Phone: [phone]  Fax: [phone]             [facility]  [address]  [facility] [address]  Phone: [phone]   Patient: Елена Atkinson   MR Number: [id number]   YOB: 1955   Date of Visit: 9/27/2024       Dear Dr. Grayson Hawthorne, Gianni Alonso    Thank you for referring Елена Atkinson to me for evaluation. Attached you will find relevant portions of my assessment and plan of care.    If you have questions, please do not hesitate to call me. I look forward to following Елена Atkinson along with you.    Sincerely,    Jeremiah Contreras MD    Enclosure    If you would like to receive this communication electronically, please contact [email or web address] or [phone] to request [employer] Link access.    [employer] Link is a tool which provides read-only access to select patient information with whom you have a relationship. Its easy to use and provides real time access to review your patients record including encounter summaries, notes, results, and demographic information.    If you feel you have received this communication in error or would no longer like to receive these types of communications, please e-mail [email or web address]

## 2025-01-07 ENCOUNTER — TELEPHONE (OUTPATIENT)
Dept: TRANSPLANT | Facility: CLINIC | Age: 70
End: 2025-01-07
Payer: MEDICARE

## 2025-01-07 DIAGNOSIS — N04.9 NEPHROTIC SYNDROME: Primary | ICD-10-CM

## 2025-01-07 NOTE — TELEPHONE ENCOUNTER
----- Message from Med Assistant Sears sent at 1/7/2025  1:58 PM CST -----  Regarding: FW: advise nephrology referral  Contact: Patient can be contacted @# [phone]    ----- Message -----  From: Suyapa Abrams  Sent: 1/7/2025   1:54 PM CST  To: [facility] Staff  Subject: advise nephrology referral                       PT is calling Dr. Contreras was going to place a nephrology referral for oatient to be seen. Was recently seen in ER on 12/31. She is needing to be seen soon. Please advise at earliest opportunity    Patient can be contacted @# [phone]

## 2025-01-09 LAB
EXT ABO: NORMAL
EXT AFP: NORMAL
EXT ALBUMIN: 2.6
EXT ALCOHOL, MEDICAL, SERUM: NORMAL
EXT ALKALINE PHOSPHATASE: 95
EXT ALT: 35
EXT AMMONIA LEVEL: NORMAL
EXT AMYLASE: NORMAL
EXT AST: 37
EXT BACTERIA UA: NORMAL
EXT BASOPHIL%: 0.6
EXT BILIRUBIN DIRECT: NORMAL
EXT BILIRUBIN TOTAL: 0.4
EXT BK VIRUS DNA QN PCR: NORMAL
EXT BK VIRUS DNA QUANT, PCR, URINE: NORMAL
EXT BLOOD (STOOL): NORMAL
EXT BLOOD CULTURE, ROUTINE: NORMAL
EXT BLOOD CULTURE: NORMAL
EXT BLOOD FUNGUS: NORMAL
EXT BLOOD WITH ANAEROBE: NORMAL
EXT BUN: 15
EXT CALCIUM: 8.2
EXT CDIFF (STOOL): NORMAL
EXT CHLORIDE: 111
EXT CHOLESTEROL: NORMAL
EXT CMV ANTIGENEMIA: NORMAL
EXT CMV DNA QUANT. BY PCR: NORMAL
EXT CO2: 27
EXT CREATININE UA: NORMAL
EXT CREATININE: 0.63 MG/DL
EXT CULTURE (STOOL): NORMAL
EXT CULTURE, BODY FLUID - BACTEC: NORMAL
EXT CYCLOSPORONE LEVEL: NORMAL
EXT DRUGS OF ABUSE CONFIRMATION: NORMAL
EXT EBV DNA BY PCR: NORMAL
EXT EBV DNA-COPIES/ML: NORMAL
EXT EBV IGG: NORMAL
EXT EBV IGM: NORMAL
EXT EGFR NO RACE VARIABLE: >90
EXT EOSINOPHIL%: 3.3
EXT ERYTHROPOIETIN: NORMAL
EXT FERRITIN: NORMAL
EXT FOLATE: NORMAL
EXT FUNGAL (STOOL): NORMAL
EXT G6PD QUAL: NORMAL
EXT GFR MDRD AF AMER: NORMAL
EXT GFR MDRD NON AF AMER: NORMAL
EXT GGT: NORMAL
EXT GLUCOSE: 72
EXT HBV DNA QUANT PCR: NORMAL
EXT HBV DNA, QUALITATIVE PCR: NORMAL
EXT HCV QUANT: NORMAL
EXT HDL: NORMAL
EXT HEMATOCRIT: 37.6
EXT HEMOGLOBIN A1C: NORMAL
EXT HEMOGLOBIN: 11.5
EXT HEP B S AB: NORMAL
EXT HEP B S AG: NORMAL
EXT HIV RNA QUANT PCR: NORMAL
EXT HIV: NORMAL
EXT IMMUNKNOW (NON-STIMULATED): NORMAL
EXT IMMUNKNOW (STIMULATED): NORMAL
EXT INR: NORMAL
EXT IRON SATURATION: NORMAL
EXT LDL CHOLESTEROL: NORMAL
EXT LIPASE: NORMAL
EXT LYMPH%: 21.2
EXT MAGNESIUM: NORMAL
EXT MONOCYTES%: 8.4
EXT O&P (STOOL): NORMAL
EXT PHOSPHORUS: NORMAL
EXT PLATELETS: 187
EXT POTASSIUM: 3.7
EXT PREALBUMIN: NORMAL
EXT PROT/CREAT RATIO UR: NORMAL
EXT PROTEIN TOTAL: 4.2
EXT PROTEIN UA: NORMAL
EXT PT: NORMAL
EXT PTH, INTACT: NORMAL
EXT PTT: NORMAL
EXT RBC UA: NORMAL
EXT RETICULOCYTE COUNT: NORMAL
EXT SARS COV-2 (COVID-19): NORMAL
EXT SEGS%: 66.1
EXT SIROLIMUS LVL: NORMAL
EXT SODIUM: 146 MMOL/L
EXT TACROLIMUS LVL: NORMAL
EXT TIBC: NORMAL
EXT TRIGLYCERIDES: NORMAL
EXT UNSATURATED IRON BINDING CAP.: NORMAL
EXT URIC ACID: NORMAL
EXT VIT D 1 25 DIHYDROXY: NORMAL
EXT VIT D 25 HYDROXY: NORMAL
EXT WBC (STOOL): NORMAL
EXT WBC UA: NORMAL
EXT WBC: 5.1

## 2025-01-13 ENCOUNTER — TELEPHONE (OUTPATIENT)
Dept: TRANSPLANT | Facility: CLINIC | Age: 70
End: 2025-01-13
Payer: MEDICARE

## 2025-01-13 NOTE — LETTER
January 13, 2025    Елена Atkinson  [address] [place] [address]          Dear Елена Atkinson:  MRN: [id number]    This is a follow up to your recent labs, your lab results were stable.  There are no medicine changes.  Please have your labs drawn again on 3/31/2025.      If you cannot have your labs drawn on the scheduled date, it is your responsibility to call the transplant department to reschedule.  Please call [phone] and ask to speak to [facility]  for all scheduling requests.     Sincerely,      Yessy Duron RN  Your Liver Transplant Coordinator    [facility]  [address] [place] [id number]  [phone]

## 2025-01-13 NOTE — TELEPHONE ENCOUNTER
Labs reviewed and are stable.  Patient to repeat labs on 3/31/25.   Letter sent to patient.          ----- Message from Jeremiah Contreras MD sent at 1/13/2025 12:42 PM CST -----  Results reviewed

## 2025-01-14 ENCOUNTER — TELEPHONE (OUTPATIENT)
Dept: TRANSPLANT | Facility: CLINIC | Age: 70
End: 2025-01-14
Payer: MEDICARE

## 2025-02-07 DIAGNOSIS — Z94.0 KIDNEY TRANSPLANT STATUS, LIVING RELATED DONOR: Primary | Chronic | ICD-10-CM

## 2025-02-18 ENCOUNTER — TELEPHONE (OUTPATIENT)
Dept: NEPHROLOGY | Facility: CLINIC | Age: 70
End: 2025-02-18
Payer: MEDICARE

## 2025-02-18 NOTE — TELEPHONE ENCOUNTER
----- Message from Jasvir sent at 2/18/2025 11:59 AM CST -----  Regarding: Returning a Missed Call  Contact: [phone]  Returning a Missed Call Caller: Pt   Returning call to: staff  Caller can be reached @: [phone] Nature of the call: r/s 2/24 appt, offered first available of 5/21 she states that's too far and would like to know if she can be seen sooner by another provider. Please call to advise thank you

## 2025-03-14 DIAGNOSIS — Z94.0 KIDNEY TRANSPLANT STATUS, LIVING RELATED DONOR: Primary | ICD-10-CM

## 2025-03-24 ENCOUNTER — OFFICE VISIT (OUTPATIENT)
Dept: NEPHROLOGY | Facility: CLINIC | Age: 70
End: 2025-03-24
Payer: MEDICARE

## 2025-03-24 VITALS
OXYGEN SATURATION: 97 % | DIASTOLIC BLOOD PRESSURE: 72 MMHG | HEART RATE: 67 BPM | SYSTOLIC BLOOD PRESSURE: 113 MMHG | WEIGHT: 182.13 LBS | BODY MASS INDEX: 32.26 KG/M2

## 2025-03-24 DIAGNOSIS — R80.9 PROTEINURIA, UNSPECIFIED TYPE: ICD-10-CM

## 2025-03-24 DIAGNOSIS — D84.9 IMMUNOSUPPRESSION: ICD-10-CM

## 2025-03-24 DIAGNOSIS — Z94.0 KIDNEY TRANSPLANT STATUS, LIVING RELATED DONOR: Primary | ICD-10-CM

## 2025-03-24 DIAGNOSIS — N18.2 CKD (CHRONIC KIDNEY DISEASE) STAGE 2, GFR 60-89 ML/MIN: ICD-10-CM

## 2025-03-24 PROCEDURE — 99204 OFFICE O/P NEW MOD 45 MIN: CPT | Mod: S$PBB,,, | Performed by: INTERNAL MEDICINE

## 2025-03-24 PROCEDURE — 99214 OFFICE O/P EST MOD 30 MIN: CPT | Mod: PBBFAC | Performed by: INTERNAL MEDICINE

## 2025-03-24 PROCEDURE — 99999 PR PBB SHADOW E&M-EST. PATIENT-LVL IV: CPT | Mod: PBBFAC,,, | Performed by: INTERNAL MEDICINE

## 2025-03-24 NOTE — PROGRESS NOTES
"ORGAN: LEFT KIDNEY  Donor Type: living  PHS Increased Risk: no  Cold Ischemia: 60 mins  Induction Medications: steroids (prednisone,methylprednisolone,solumedrol,medrol,decadron)    Chief Complaint   Patient presents with    Follow-up     History of present illness:  Patient is a 70 y.o. 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    Allograft function  Creatinine   Date Value Ref Range Status   08/31/2023 0.8 0.5 - 1.4 mg/dL Final   08/30/2022 0.51 (L) 0.55 - 1.02 mg/dL Final   05/09/2022 0.57 0.55 - 1.02 mg/dL Final   04/04/2022 0.8 0.5 - 1.4 mg/dL Final   01/13/2022 0.54 (L) 0.55 - 1.02 mg/dL Final   12/30/2021 0.55 0.55 - 1.02 mg/dL Final   12/28/2021 0.65 0.55 - 1.02 mg/dL Final   09/17/2021 0.58 0.55 - 1.02 mg/dL Final   07/20/2021 0.54 (L) 0.55 - 1.02 mg/dL Final   05/18/2021 0.49 (L) 0.55 - 1.02 mg/dL Final   05/04/2021 0.53 (L) 0.55 - 1.02 mg/dL Final   01/26/2021 0.52 (L) 0.55 - 1.02 mg/dL Final   03/02/2017 0.7 0.5 - 1.4 mg/dL Final   02/23/2017 0.7 0.5 - 1.4 mg/dL Final   10/23/2015 0.8 0.6 - 1.3 mg/dL Final   10/08/2015 0.9 mg/dL Final   09/25/2015 0.7 mg/dL Final   03/09/2015 0.7 0.5 - 1.4 mg/dL Final   10/15/2012 1.0 0.5 - 1.4 mg/dl Final   09/05/2012 1.0 0.5 - 1.4 mg/dl Final   02/07/2012 0.8 0.5 - 1.4 mg/dl Final   02/06/2012 0.8 0.5 - 1.4 mg/dl Final   02/05/2012 0.7 0.5 - 1.4 mg/dl Final     Prot/Creat Ratio, Urine   Date Value Ref Range Status   08/26/2013 7.80 (H) 0 - 0.2 Final   02/07/2012 1.94 (H) 0 - 0.2 Final   03/2025 UPCR 5 g   Has been drinking 55-60 oz of fluid a day  Not taking NSAIDs  IS with Tacrolimus 0.5 mg BID, Cellcept 750 mg BID, Prednisone 3 mg daily  Lab Results   Component Value Date    TACROLIMUS 10.8 03/20/2025    TACROLIMUS 8.0 01/08/2025    TACROLIMUS 3.9 (L) 08/30/2022     No results found for: "CYCLOSPORINE"    HTN  /72 mmHg  BP at home " not checking  Current medication: Losartan 100 mg daily, amlodipine 2.5 mg daily    Type 2 DM  Lab Results   Component Value Date    HGBA1C 7.5 (H) 08/29/2023   Ozempic, stopped 1 month ago due to the company ran out of the medicine  With insulin therapy    ROS    History:  Past Medical History:   Diagnosis Date    Brain TIA 05/12/2021    CKD (chronic kidney disease) 08/26/2013    ESRD (end stage renal disease) 12/18/2007    IgA Nephropathy    Gout     History of HCV, s/p successful Rx w/ SVR24 - 5/2016     Liver biopsy 2/6/12: Stage 1 fibrosis Completed 24 weeks Harvoni + RBV w/ SVR    Hyperlipidemia     Hypertension, renal     IgA nephropathy     Immunosuppressive management encounter following kidney transplant     Kidney transplant status, living related donor 08/19/2009    Obesity     Thyroid disease     Type 2 diabetes mellitus       Past Surgical History:   Procedure Laterality Date    CARDIAC SURGERY      CHOLECYSTECTOMY  2005    With liver transplant    FRACTURE SURGERY  2010    Arm broke. Have a zarina    KIDNEY TRANSPLANT      LIVER TRANSPLANT      TUBAL LIGATION        Current Medications[1]  Review of patient's allergies indicates:   Allergen Reactions    Lisinopril      Other reaction(s): Cough    Morphine      Other reaction(s): Itching, Other (See Comments)  Stomach cramps        Social History     Tobacco Use    Smoking status: Never    Smokeless tobacco: Never   Substance Use Topics    Alcohol use: No      Family History   Problem Relation Name Age of Onset    Cancer Mother Danica Duron 52        Lung    Hypertension Mother Danica Duron     Alcohol abuse Mother Danica Duron     Depression Mother Danica Duron         Sometimes    Miscarriages / Stillbirths Mother Danica Duron     Alcohol abuse Maternal Grandfather [place]ito [place]     Asthma Maternal Grandfather [place]     COPD Maternal Grandfather [place]         Smoker    Hearing loss Maternal Grandfather [place]     Arthritis Maternal  "Grandmother Dolores [place]     Stroke Maternal Grandmother Dolores [place]         In her 80s    Cancer Maternal Uncle Augie Grove         Lung. Smokef    Drug abuse Maternal Uncle Augie Grove     Learning disabilities Son Patrice Palmer         Add    Mental illness Son Patrice Palmer         Bi-polar    Learning disabilities Son Irving Moya         Written Expression    Kidney disease Neg Hx          Physical Exam :  Vitals:    03/24/25 1532   BP: 113/72   Pulse: 67     Constitutional - No acute distress  HEENT - Grossly normal  Neck - supple.   Cardiovascular - JVP normal  Respiratory - Clear  Musculoskeletal - Peripheral edema no  Dermatologic/Skin - Skin warm and dry.  No rashes.    Neurologic - No acute neurological deficit  Psychiatric - AAOx3    Labs reviewed   Images Reviewed    Assessment:    1. Kidney transplant status, living related donor    2. Proteinuria, unspecified type    3. Immunosuppression    4. CKD (chronic kidney disease) stage 2, GFR 60-89 ml/min        Plan:    1. 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 Continue with oral hydration.    2. Immunosuppression:   Will closely monitor for toxicities, education provided about adherence to medicines and need to communicate any side effect to the transplant nurse or physician.  Lab Results   Component Value Date    TACROLIMUS 10.8 03/20/2025    TACROLIMUS 8.0 01/08/2025    TACROLIMUS 3.9 (L) 08/30/2022     No results found for: "CYCLOSPORINE"    3. Hypertension management:    Continue with home blood pressure monitoring, low salt and healthy life discussed with the patient    4. Metabolic Bone Disease/Secondary Hyperparathyroidism:   Calcium and phosphorus level discussed with the " "patient.  Lab Results   Component Value Date    PTH 62 03/20/2025    CALCIUM 8.9 08/31/2023    CAION 1.17 09/05/2012    PHOS 4.0 09/05/2012    PHOS 3.5 02/06/2012    PHOS 3.6 02/05/2012       5. Electrolytes:   Reviewed with the patient, essentially within the normal range no need for acute changes today, will monitor as per our center guidelines.  Lab Results   Component Value Date     08/31/2023    K 4.6 08/31/2023     08/31/2023    CO2 26 08/31/2023    CO2 26 08/30/2022    CO2 27 05/09/2022       6. No Anemia:   Will continue monitoring as per our center guidelines. No indication for acute intervention today.  Lab Results   Component Value Date    WBC 7.8 03/20/2025    HGB 11.3 03/20/2025    HCT 34.8 03/20/2025    MCV 97.8 (H) 03/20/2025     03/20/2025       7.Proteinuria:   Will continue with pr/cr ratio as per our center guidelines  Lab Results   Component Value Date    PROTEINURINE 322 03/20/2025    CREATRANDUR 59.8 03/20/2025    UTPCR 7.80 (H) 08/26/2013    UTPCR 1.94 (H) 02/07/2012    UTPCR 3.00 (H) 02/06/2012   Serology work up include SPEP, SFLC, quantitative Ig, hep B/C, PLA2R, RPR, JOSE, dsDNA, C3, C4, cryo (HCV positive), RF will be sent.  [facility]. Will discuss with [facility] nephrologist regarding potential biopsy depending on the result.    8. BK virus infection screening:   Will continue with urine or blood PCR as per our guidelines to prevent BK virus viremia and allograft dysfunction  No results found for: "BKVIRUSDNAUR", "BKQUANTURINE", "BKVIRUSLOG", "BKVIRUSURINE"      9. Weight education:   Provided during the clinic visit.  Body mass index is 32.26 kg/m².     10.Patient safety education regarding immunosuppression including prophylaxis posttransplant for CMV, PCP :   Education provided about vaccination and prevention of infections.    11.  Cytopenias:   No significant cytopenias will monitor as per our guidelines. Medicine list reviewed including potential causes of drug-induced " cytopenias  Lab Results   Component Value Date    WBC 7.8 03/20/2025    HGB 11.3 03/20/2025    HCT 34.8 03/20/2025    MCV 97.8 (H) 03/20/2025     03/20/2025       No follow-ups on file.     Ronnie Peralta          [1]   Current Outpatient Medications:     allopurinoL (ZYLOPRIM) 300 MG tablet, TAKE 1 TABLET BY MOUTH IN THE  MORNING, Disp: 90 tablet, Rfl: 3    atorvastatin (LIPITOR) 20 MG tablet, TAKE 1 TABLET BY MOUTH IN THE  EVENING, Disp: 90 tablet, Rfl: 3    cyanocobalamin (VITAMIN B-12) 100 MCG tablet, Take 100 mcg by mouth after lunch. , Disp: , Rfl:     cyclobenzaprine (FLEXERIL) 10 MG tablet, Take 10 mg by mouth daily as needed., Disp: , Rfl:     EScitalopram oxalate (LEXAPRO) 10 MG tablet, TAKE 1 TABLET BY MOUTH ONCE  DAILY, Disp: 90 tablet, Rfl: 3    levothyroxine (SYNTHROID) 75 MCG tablet, TAKE 1 TABLET BY MOUTH BEFORE  BREAKFAST, Disp: 90 tablet, Rfl: 3    LORazepam (ATIVAN) 0.5 MG tablet, Take 0.5 mg by mouth 2 (two) times daily as needed., Disp: , Rfl:     losartan (COZAAR) 100 MG tablet, Take 100 mg by mouth., Disp: , Rfl:     magnesium glycinate 100 mg Tab, Take 5 tablets by mouth once daily. 500 mg daily, Disp: , Rfl:     multivitamin (THERAGRAN) per tablet, Take 1 tablet by mouth after lunch. , Disp: , Rfl:     mycophenolate (CELLCEPT) 250 mg Cap, TAKE 3 CAPSULES TWICE DAILY, Disp: 180 capsule, Rfl: 11    predniSONE (DELTASONE) 1 MG tablet, Take 1 tablet (1 mg total) by mouth 3 (three) times daily. (Patient taking differently: Take 3 mg by mouth once daily.), Disp: 90 tablet, Rfl: 5    semaglutide (OZEMPIC) 1 mg/dose (2 mg/1.5 mL) PnIj, Inject 1 mg into the skin every 7 days., Disp: , Rfl:     tacrolimus (PROGRAF) 0.5 MG Cap, Take 1 capsule (0.5 mg total) by mouth every 12 (twelve) hours., Disp: 60 capsule, Rfl: 11    temazepam (RESTORIL) 15 mg Cap, TAKE 1 CAPSULE BY MOUTH DAILY AS NEEDED FOR SLEEP, Disp: 30 capsule, Rfl: 2    TRESIBA FLEXTOUCH U-100 100 unit/mL (3 mL) insulin pen, Inject into  the skin., Disp: , Rfl:     TRESIBA FLEXTOUCH U-100 100 unit/mL (3 mL) insulin pen, INJECT SUBCUTANEOUSLY 50 UNITS  ONCE DAILY, Disp: 45 mL, Rfl: 3    VITAMIN D2 1,250 mcg (50,000 unit) capsule, Take by mouth., Disp: , Rfl:     hydrOXYzine pamoate (VISTARIL) 25 MG Cap, Take 25 mg by mouth daily as needed. (Patient not taking: Reported on 3/24/2025), Disp: , Rfl:

## 2025-03-25 ENCOUNTER — HOSPITAL ENCOUNTER (OUTPATIENT)
Dept: RADIOLOGY | Facility: HOSPITAL | Age: 70
Discharge: HOME | End: 2025-03-25
Attending: INTERNAL MEDICINE
Payer: MEDICARE

## 2025-03-25 DIAGNOSIS — Z94.0 KIDNEY TRANSPLANT STATUS, LIVING RELATED DONOR: ICD-10-CM

## 2025-03-25 PROCEDURE — 76776 US EXAM K TRANSPL W/DOPPLER: CPT | Mod: 26,,, | Performed by: STUDENT IN AN ORGANIZED HEALTH CARE EDUCATION/TRAINING PROGRAM

## 2025-03-25 PROCEDURE — 76776 US EXAM K TRANSPL W/DOPPLER: CPT | Mod: TC

## 2025-03-26 ENCOUNTER — PATIENT MESSAGE (OUTPATIENT)
Dept: NEPHROLOGY | Facility: CLINIC | Age: 70
End: 2025-03-26
Payer: MEDICARE

## 2025-03-26 DIAGNOSIS — Z94.0 KIDNEY TRANSPLANT STATUS, LIVING RELATED DONOR: Primary | ICD-10-CM

## 2025-03-26 DIAGNOSIS — D84.9 IMMUNOSUPPRESSION: Primary | ICD-10-CM

## 2025-03-26 DIAGNOSIS — R80.9 PROTEINURIA, UNSPECIFIED TYPE: ICD-10-CM

## 2025-03-26 NOTE — PROGRESS NOTES
Nephrotic range proteinuria still noted. Serology workup negative so far. (correction from my prior note, the K/L is normal) Txp US showed kidney size 14 cm. Referral sent to renal txp. Likely will need txp renal biopsy.    Tac level is high, message sent to the pt to confirm the timing of the lab draw, will adjust accordingly.

## 2025-04-02 DIAGNOSIS — N18.2 CKD (CHRONIC KIDNEY DISEASE) STAGE 2, GFR 60-89 ML/MIN: ICD-10-CM

## 2025-04-02 DIAGNOSIS — D84.9 IMMUNOSUPPRESSION: Primary | ICD-10-CM

## 2025-04-02 DIAGNOSIS — R80.9 PROTEINURIA, UNSPECIFIED TYPE: ICD-10-CM

## 2025-04-04 DIAGNOSIS — Z94.4 LIVER REPLACED BY TRANSPLANT: Primary | ICD-10-CM

## 2025-04-14 ENCOUNTER — LAB VISIT (OUTPATIENT)
Dept: LAB | Facility: HOSPITAL | Age: 70
End: 2025-04-14
Attending: INTERNAL MEDICINE
Payer: MEDICARE

## 2025-04-14 ENCOUNTER — TELEPHONE (OUTPATIENT)
Dept: NEPHROLOGY | Facility: CLINIC | Age: 70
End: 2025-04-14
Payer: MEDICARE

## 2025-04-14 ENCOUNTER — OFFICE VISIT (OUTPATIENT)
Dept: TRANSPLANT | Facility: CLINIC | Age: 70
End: 2025-04-14
Payer: MEDICARE

## 2025-04-14 VITALS
BODY MASS INDEX: 30.54 KG/M2 | TEMPERATURE: 97 F | SYSTOLIC BLOOD PRESSURE: 128 MMHG | HEIGHT: 63 IN | WEIGHT: 172.38 LBS | DIASTOLIC BLOOD PRESSURE: 80 MMHG | RESPIRATION RATE: 18 BRPM | OXYGEN SATURATION: 95 % | HEART RATE: 86 BPM

## 2025-04-14 DIAGNOSIS — E11.9 TYPE 2 DIABETES MELLITUS WITHOUT COMPLICATION, WITH LONG-TERM CURRENT USE OF INSULIN: ICD-10-CM

## 2025-04-14 DIAGNOSIS — Z94.0 KIDNEY TRANSPLANT STATUS, LIVING RELATED DONOR: Primary | ICD-10-CM

## 2025-04-14 DIAGNOSIS — Z79.4 TYPE 2 DIABETES MELLITUS WITHOUT COMPLICATION, WITH LONG-TERM CURRENT USE OF INSULIN: ICD-10-CM

## 2025-04-14 DIAGNOSIS — R80.9 PROTEINURIA, UNSPECIFIED TYPE: ICD-10-CM

## 2025-04-14 DIAGNOSIS — D84.821 IMMUNODEFICIENCY DUE TO LONG TERM DRUG THERAPY: ICD-10-CM

## 2025-04-14 DIAGNOSIS — Z94.0 KIDNEY TRANSPLANT STATUS, LIVING RELATED DONOR: Primary | Chronic | ICD-10-CM

## 2025-04-14 DIAGNOSIS — Z29.89 PROPHYLACTIC IMMUNOTHERAPY: ICD-10-CM

## 2025-04-14 DIAGNOSIS — Z94.4 LIVER REPLACED BY TRANSPLANT: ICD-10-CM

## 2025-04-14 DIAGNOSIS — Z79.899 IMMUNODEFICIENCY DUE TO LONG TERM DRUG THERAPY: ICD-10-CM

## 2025-04-14 LAB
ABSOLUTE EOSINOPHIL (OHS): 0.42 K/UL
ABSOLUTE MONOCYTE (OHS): 0.68 K/UL (ref 0.3–1)
ABSOLUTE NEUTROPHIL COUNT (OHS): 6.02 K/UL (ref 1.8–7.7)
ALBUMIN SERPL BCP-MCNC: 1.8 G/DL (ref 3.5–5.2)
ALP SERPL-CCNC: 89 UNIT/L (ref 40–150)
ALT SERPL W/O P-5'-P-CCNC: 31 UNIT/L (ref 10–44)
ANION GAP (OHS): 7 MMOL/L (ref 8–16)
AST SERPL-CCNC: 37 UNIT/L (ref 11–45)
BASOPHILS # BLD AUTO: 0.12 K/UL
BASOPHILS NFR BLD AUTO: 1.4 %
BILIRUB SERPL-MCNC: 0.3 MG/DL (ref 0.1–1)
BUN SERPL-MCNC: 23 MG/DL (ref 8–23)
CALCIUM SERPL-MCNC: 8.8 MG/DL (ref 8.7–10.5)
CHLORIDE SERPL-SCNC: 107 MMOL/L (ref 95–110)
CO2 SERPL-SCNC: 24 MMOL/L (ref 23–29)
CREAT SERPL-MCNC: 0.7 MG/DL (ref 0.5–1.4)
ERYTHROCYTE [DISTWIDTH] IN BLOOD BY AUTOMATED COUNT: 13.2 % (ref 11.5–14.5)
GFR SERPLBLD CREATININE-BSD FMLA CKD-EPI: >60 ML/MIN/1.73/M2
GLUCOSE SERPL-MCNC: 91 MG/DL (ref 70–110)
HCT VFR BLD AUTO: 34.6 % (ref 37–48.5)
HGB BLD-MCNC: 11 GM/DL (ref 12–16)
IMM GRANULOCYTES # BLD AUTO: 0.09 K/UL (ref 0–0.04)
IMM GRANULOCYTES NFR BLD AUTO: 1 % (ref 0–0.5)
LYMPHOCYTES # BLD AUTO: 1.46 K/UL (ref 1–4.8)
MCH RBC QN AUTO: 31.2 PG (ref 27–31)
MCHC RBC AUTO-ENTMCNC: 31.8 G/DL (ref 32–36)
MCV RBC AUTO: 98 FL (ref 82–98)
NUCLEATED RBC (/100WBC) (OHS): 0 /100 WBC
PLATELET # BLD AUTO: 287 K/UL (ref 150–450)
PMV BLD AUTO: 8.7 FL (ref 9.2–12.9)
POTASSIUM SERPL-SCNC: 4 MMOL/L (ref 3.5–5.1)
PROT SERPL-MCNC: 5.5 GM/DL (ref 6–8.4)
RBC # BLD AUTO: 3.53 M/UL (ref 4–5.4)
RELATIVE EOSINOPHIL (OHS): 4.8 %
RELATIVE LYMPHOCYTE (OHS): 16.6 % (ref 18–48)
RELATIVE MONOCYTE (OHS): 7.7 % (ref 4–15)
RELATIVE NEUTROPHIL (OHS): 68.5 % (ref 38–73)
SODIUM SERPL-SCNC: 138 MMOL/L (ref 136–145)
TACROLIMUS BLD-MCNC: 3 NG/ML (ref 5–15)
WBC # BLD AUTO: 8.79 K/UL (ref 3.9–12.7)

## 2025-04-14 PROCEDURE — 36415 COLL VENOUS BLD VENIPUNCTURE: CPT

## 2025-04-14 PROCEDURE — 80197 ASSAY OF TACROLIMUS: CPT

## 2025-04-14 PROCEDURE — 82040 ASSAY OF SERUM ALBUMIN: CPT

## 2025-04-14 PROCEDURE — 99215 OFFICE O/P EST HI 40 MIN: CPT | Mod: PBBFAC | Performed by: STUDENT IN AN ORGANIZED HEALTH CARE EDUCATION/TRAINING PROGRAM

## 2025-04-14 PROCEDURE — 99999 PR PBB SHADOW E&M-EST. PATIENT-LVL V: CPT | Mod: PBBFAC,,, | Performed by: STUDENT IN AN ORGANIZED HEALTH CARE EDUCATION/TRAINING PROGRAM

## 2025-04-14 PROCEDURE — 85025 COMPLETE CBC W/AUTO DIFF WBC: CPT

## 2025-04-14 NOTE — LETTER
April 14, 2025        Ronnie Peralta  [address] [facility] [address] [address]  Phone: [phone]  Fax: [phone]             [facility]- [address]  [address]  [place] [address]  Phone: [phone]   Patient: Елена Atkinson   MR Number: [id number]   YOB: 1955   Date of Visit: 4/14/2025       Dear Dr. Ronnie Peralta    Thank you for referring Елена Atkinson to me for evaluation. Attached you will find relevant portions of my assessment and plan of care.    If you have questions, please do not hesitate to call me. I look forward to following Елена Atkinson along with you.    Sincerely,    Donavan Cruz Jr., MD    Enclosure    If you would like to receive this communication electronically, please contact [email or web address] or [phone] to request [employer] Link access.    [employer] Link is a tool which provides read-only access to select patient information with whom you have a relationship. Its easy to use and provides real time access to review your patients record including encounter summaries, notes, results, and demographic information.    If you feel you have received this communication in error or would no longer like to receive these types of communications, please e-mail [email or web address]

## 2025-04-14 NOTE — PROGRESS NOTES
Post-Transplant Assessment    Referring Physician: Grayson Hawthorne  Current Nephrologist: Ronnie Peralta    ORGAN: LEFT KIDNEY  Donor Type: living  PHS Increased Risk: no  Cold Ischemia: 60 mins  Induction Medications: steroids (prednisone,methylprednisolone,solumedrol,medrol,decadron)    Chief Complaint   Patient presents with    Kidney Transplant Reassessment     Presents to the clinic today for medical conditions listed below.  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 Patient and I agreed to perform biopsy to assess for reversible causes of the proteinuria. Serological workup thus far is negative.        Today:  History of Present Illness    Елена presents today for follow up of liver and kidney transplants. She underwent liver transplant in 2003 due to hepatitis C, followed by a living related kidney transplant in 2009 from her son. She reports instability with walking and memory loss. She experiences muscle cramps when magnesium levels decrease, which she manages by increasing magnesium supplementation. She takes Tacrolimus 0.5mg daily (recently reduced from twice daily), CellCept (mycophenolate) 750mg twice daily, and Losartan 100mg for blood pressure management.      ROS:  Gastrointestinal: +heartburn  Musculoskeletal: +muscle cramps  Neurological: +difficulty walking, +abnormal gait, +memory loss         Physical Exam :  Vitals:    04/14/25 1028   BP: 128/80    Pulse: 86   Resp: 18   Temp: 97.3 °F (36.3 °C)     Physical Exam    General: Well-appearing.  Lungs: Normal respiratory effort. No respiratory distress.  Extremities: No edema lower extremities.         Last 3 sets of metabolic panel, blood count, drug levels reviewed; Most recent discharge reviewed; Most recent imaging of the kidneys reviewed and noted when available  1. Kidney transplant status, living related donor 8/19/2009    2. Proteinuria, unspecified type    3. Prophylactic immunotherapy    4. Type 2 diabetes mellitus without complication, with long-term current use of insulin    5. Liver replaced by transplant for HCV 7/2003    6. Immunodeficiency due to long term drug therapy      These problems pose a threat to patient's life or bodily function    Assessment & Plan    Z94.4 Liver transplant status  Z94.0 Kidney transplant status  B18.2 Chronic viral hepatitis C  I10 Essential (primary) hypertension  D64.89 Other specified anemias  E83.40 Disorders of magnesium metabolism, unspecified  E83.39 Other disorders of phosphorus metabolism  R26.81 Unsteadiness on feet  R41.3 Other amnesia  R25.2 Cramp and spasm  R80.9 Proteinuria, unspecified  Z79.620 Long term (current) use of immunosuppressive biologic    LIVER AND KIDNEY TRANSPLANT STATUS:  - Reviewed history of liver transplant in 2003 due to hepatitis C and living related kidney transplant in 2009.  - Renal function excellent with creatinine of 0.7, indicating long-term viability of transplanted kidney.  - Continued current immunosuppression regimen due to excellent renal function.  - Will reassess treatment strategy after biopsy results.  - Explained 5 main functions of the kidney: water management, electrolyte balance, acid neutralization, erythropoietin production, and bone health regulation.  - Discussed relationship between creatinine levels and renal function.    CHRONIC VIRAL HEPATITIS C:  - Reviewed history of liver transplant in 2003 due to  hepatitis C.    ESSENTIAL HYPERTENSION AND PROTEINURIA:  - Discussed potential addition of SGLT2 inhibitors (e.g., Farxiga, Jardiance) or MRA (e.g., finerenone) to slow renal disease progression.  - Educated on long-term effects of proteinuria on kidney health.  - Explained mechanism of action for SGLT2 inhibitors in kidney protection.  - Clarified that protein in urine itself is not immediately life-threatening, but can cause slow damage to kidneys over time.  - Continued Losartan 100 mg for BP control and proteinuria reduction.  - Ordered kidney biopsy to be performed within the next 3 weeks to investigate cause of proteinuria.    ANEMIA AND ELECTROLYTE DISORDERS:  - Labs showed normal WBC count, slightly low hemoglobin (11.5), and normal platelet count.  - Slightly low phosphorus and magnesium levels, likely due to tacrolimus effects.    IMMUNOSUPPRESSION MANAGEMENT:  - Continued Tacrolimus 0.5 mg daily as per recent adjustment.  - Continued CellCept (mycophenolate) 250 mg, 3 pills twice daily.    FOLLOW-UP PLAN:  - Referred to Dr. Warren's group at the [facility] for kidney biopsy.  - Follow up in 3 months to discuss biopsy results and potential treatment adjustments.  - Contact the office if any issues arise before the scheduled follow-up.         Immediate changes:    Routine kidney biopsy to assess proteinuria  RTC in 3 months    1. CKD stage:   Will continue follow up as per our center guidelines. patient to continue close follow up with the local General nephrologist. Education provided in appropriate fluid intake, potassium intake. Continue with oral hydration.    2. Immunosuppression:   Will closely monitor for toxicities, education provided about adherence to medicines and need to communicate any side effect to the transplant nurse or physician.  Lab Results   Component Value Date    TACROLIMUS 3.0 (L) 04/14/2025    TACROLIMUS 7.0 04/04/2025    TACROLIMUS 9.1 03/25/2025     No results found for:  ""CYCLOSPORINE"    3. Allograft Function:  Stable at baseline for the patient. Continue follow up as per our guidelines and with the local General nephrologist. Communication will be sent today.  Lab Results   Component Value Date    CREATININE 0.7 04/14/2025    CREATININE 0.7 03/25/2025    CREATININE 0.8 08/31/2023     No results found for: "AMYLASE", "LIPASE"    4. Hypertension management:    Continue with home blood pressure monitoring, low salt and healthy life discussed with the patient    5. Metabolic Bone Disease/Secondary Hyperparathyroidism:   Calcium and phosphorus level discussed with the patient, patient will continue follow up with the general nephrologist for management of metabolic bone disease. Calcium and phosphorus as per our center protocol.  Lab Results   Component Value Date    PTH 86 (H) 04/04/2025    CALCIUM 8.8 04/14/2025    CAION 1.17 09/05/2012    PHOS 2.6 (L) 03/25/2025    PHOS 4.0 09/05/2012    PHOS 3.5 02/06/2012       6. Electrolytes:   Reviewed with the patient, essentially within the normal range no need for acute changes today, will monitor as per our center guidelines.  Lab Results   Component Value Date     04/14/2025    K 4.0 04/14/2025     04/14/2025    CO2 24 04/14/2025    CO2 23 03/25/2025    CO2 26 08/31/2023       7. Anemia:   Will continue monitoring as per our center guidelines. No indication for acute intervention today.  Lab Results   Component Value Date    WBC 8.79 04/14/2025    HGB 11.0 (L) 04/14/2025    HCT 34.6 (L) 04/14/2025    MCV 98 04/14/2025     04/14/2025       8.Proteinuria:   Will continue with pr/cr ratio as per our center guidelines  Lab Results   Component Value Date    PROTEINURINE 273 04/04/2025    CREATRANDUR 60.6 04/04/2025    UTPCR 7.80 (H) 08/26/2013    UTPCR 1.94 (H) 02/07/2012    UTPCR 3.00 (H) 02/06/2012        9. BK virus infection screening:   Will continue with urine or blood PCR as per our guidelines to prevent BK virus " "viremia and allograft dysfunction  No results found for: "BKVIRUSDNAUR", "BKQUANTURINE", "BKVIRUSLOG", "BKVIRUSURINE"      10. Weight education:   Provided during the clinic visit.  Body mass index is 30.54 kg/m².     11.Patient safety education regarding immunosuppression including prophylaxis posttransplant for CMV, PCP :   Education provided about vaccination and prevention of infections.    12.  Cytopenias:   No significant cytopenias will monitor as per our guidelines. Medicine list reviewed including potential causes of drug-induced cytopenias  Lab Results   Component Value Date    WBC 8.79 04/14/2025    HGB 11.0 (L) 04/14/2025    HCT 34.6 (L) 04/14/2025    MCV 98 04/14/2025     04/14/2025       13. Post-transplant Prophylaxis: CMV Infection, PJP and Candida mucosistis and other indicated for this particular patient.     I spent a total of 59 minutes on the day of the visit.      No orders of the defined types were placed in this encounter.    There are no discontinued medications.   Future Appointments   Date Time Provider Department Center   6/30/2025 11:00 AM Ronnie Peralta MD [facility]       Follow-up:   Clinic: return to transplant clinic weekly for the first month after transplant; every 2 weeks during months 2-3; then at 6-, 9-, 12-, 18-, 24-, and 36- months post-transplant to reassess for complications from immunosuppression toxicity and monitor for rejection.  Annually thereafter.    Labs: since patient remains at high risk for rejection and drug-related complications that warrant close monitoring, labs will be ordered as follows: continue twice weekly CBC, renal panel, and drug level for first month; then same labs once weekly through 3rd month post-transplant.  Urine for UA and protein/creatinine ratio monthly.  Serum BK - PCR at 1-, 3-, 6-, 9-, 12-, 18-, 24-, 36- 48-, and 60 months post-transplant.  Hepatic panel at 1-, 2-, 3-, 6-, 9-, 12-, 18-, 24-, and 36- months " post-transplant.    Donavan Cruz Jr., MD       Education:   Material provided to the patient.  Patient reminded to call with any health changes since these can be early signs of significant complications.  Also, I advised the patient to be sure any new medications or changes of old medications are discussed with either a pharmacist or physician knowledgeable with transplant to avoid rejection/drug toxicity related to significant drug interactions.    Patient advised that it is recommended that all transplanted patients, and their close contacts and household members receive Covid vaccination.    Visit today included increased complexity associated with the care of the episodic problem kidney transplant addressed and managing the longitudinal care of the patient due to the serious and/or complex managed problem(s) kidney transplant, CKD, HTN, secondary hyperparathyroidism, and anemia of chronic disease.    UNOS Patient Status  Functional Status: 90% - Able to carry on normal activity: minor symptoms of disease  Physical Capacity: No Limitations

## 2025-04-15 ENCOUNTER — TELEPHONE (OUTPATIENT)
Dept: INTERVENTIONAL RADIOLOGY/VASCULAR | Facility: CLINIC | Age: 70
End: 2025-04-15
Payer: MEDICARE

## 2025-04-20 ENCOUNTER — RESULTS FOLLOW-UP (OUTPATIENT)
Dept: TRANSPLANT | Facility: CLINIC | Age: 70
End: 2025-04-20
Payer: MEDICARE

## 2025-04-20 NOTE — LETTER
April 23, 2025    Елена Atkinson  [address] [place] [address]          Dear Елена Atkinson:  MRN: [id number]    This is a follow up to your recent labs, your lab results were stable.  There are no medicine changes.  Please have your labs drawn again on 7/14/25.      If you cannot have your labs drawn on the scheduled date, it is your responsibility to call the transplant department to reschedule.  Please call [phone] and ask to speak to Rosetta Castellano, Medical Assistant for all scheduling requests.     Sincerely,      Yessy Duron RN  Your Liver Transplant Coordinator    [facility]  [address] [place] [id number]  [phone]

## 2025-04-23 NOTE — TELEPHONE ENCOUNTER
Labs reviewed and are stable.  Patient to repeat labs on 7/14/25.   Letter sent to patient.        ----- Message from Jeremiah Contreras MD sent at 4/20/2025  9:56 PM CDT -----  Results reviewed    ----- Message -----  From: Lab, Background User  Sent: 4/14/2025  11:05 AM CDT  To: Jeremiah Contreras MD

## 2025-04-24 ENCOUNTER — PATIENT MESSAGE (OUTPATIENT)
Dept: INTERVENTIONAL RADIOLOGY/VASCULAR | Facility: HOSPITAL | Age: 70
End: 2025-04-24
Payer: MEDICARE

## 2025-04-28 ENCOUNTER — TELEPHONE (OUTPATIENT)
Dept: INTERVENTIONAL RADIOLOGY/VASCULAR | Facility: HOSPITAL | Age: 70
End: 2025-04-28
Payer: MEDICARE

## 2025-04-28 NOTE — NURSING
Pre-procedure call complete.  2 patient identifier used (name and ).  Pt instructed not to eat  anything after midnight the night before procedure.  Pt aware will need someone to provide transport home and monitor pt 8 hours post procedure.  No driving for at least 24 hours after procedure.   Patient advised to take blood pressure, heart medications, and anti-rejections medications with a sip of water morning of procedure.  Patient verbalized aware of which medications to take.  Do not take oral diabetic medication, sleep medication (including OTC) and anxiety medication the night before procedure.  Arrival time and location given.  Expected length of stay reviewed.  Covid screening completed.  Pt verbalized understanding of all pre-procedure instructions.  Written instructions and directions sent to patient in [employer] [facility]portal.

## 2025-05-01 ENCOUNTER — HOSPITAL ENCOUNTER (OUTPATIENT)
Dept: INTERVENTIONAL RADIOLOGY/VASCULAR | Facility: HOSPITAL | Age: 70
Discharge: HOME | End: 2025-05-01
Attending: STUDENT IN AN ORGANIZED HEALTH CARE EDUCATION/TRAINING PROGRAM
Payer: MEDICARE

## 2025-05-01 VITALS
OXYGEN SATURATION: 96 % | DIASTOLIC BLOOD PRESSURE: 76 MMHG | RESPIRATION RATE: 19 BRPM | HEIGHT: 63 IN | WEIGHT: 172 LBS | HEART RATE: 81 BPM | SYSTOLIC BLOOD PRESSURE: 146 MMHG | BODY MASS INDEX: 30.48 KG/M2 | TEMPERATURE: 98 F

## 2025-05-01 DIAGNOSIS — Z94.0 KIDNEY TRANSPLANT STATUS, LIVING RELATED DONOR: Primary | ICD-10-CM

## 2025-05-01 LAB — POCT GLUCOSE: 158 MG/DL (ref 70–110)

## 2025-05-01 PROCEDURE — 88305 TISSUE EXAM BY PATHOLOGIST: CPT | Performed by: STUDENT IN AN ORGANIZED HEALTH CARE EDUCATION/TRAINING PROGRAM

## 2025-05-01 PROCEDURE — 63600175 PHARM REV CODE 636 W HCPCS: Performed by: RADIOLOGY

## 2025-05-01 PROCEDURE — C1729 CATH, DRAINAGE: HCPCS

## 2025-05-01 RX ORDER — HYDRALAZINE HYDROCHLORIDE 20 MG/ML
10 INJECTION INTRAMUSCULAR; INTRAVENOUS ONCE AS NEEDED
Status: DISCONTINUED | OUTPATIENT
Start: 2025-05-01 | End: 2025-05-02 | Stop reason: HOSPADM

## 2025-05-01 RX ORDER — LIDOCAINE HYDROCHLORIDE 10 MG/ML
1 INJECTION, SOLUTION EPIDURAL; INFILTRATION; INTRACAUDAL; PERINEURAL ONCE
Status: DISCONTINUED | OUTPATIENT
Start: 2025-05-01 | End: 2025-05-02 | Stop reason: HOSPADM

## 2025-05-01 RX ORDER — FENTANYL CITRATE 50 UG/ML
INJECTION, SOLUTION INTRAMUSCULAR; INTRAVENOUS
Status: COMPLETED | OUTPATIENT
Start: 2025-05-01 | End: 2025-05-01

## 2025-05-01 RX ORDER — SODIUM CHLORIDE 9 MG/ML
INJECTION, SOLUTION INTRAVENOUS CONTINUOUS
Status: DISCONTINUED | OUTPATIENT
Start: 2025-05-01 | End: 2025-05-02 | Stop reason: HOSPADM

## 2025-05-01 RX ORDER — LIDOCAINE HYDROCHLORIDE 10 MG/ML
INJECTION, SOLUTION INFILTRATION; PERINEURAL
Status: COMPLETED | OUTPATIENT
Start: 2025-05-01 | End: 2025-05-01

## 2025-05-01 RX ORDER — MIDAZOLAM HYDROCHLORIDE 1 MG/ML
INJECTION, SOLUTION INTRAMUSCULAR; INTRAVENOUS
Status: COMPLETED | OUTPATIENT
Start: 2025-05-01 | End: 2025-05-01

## 2025-05-01 RX ADMIN — LIDOCAINE HYDROCHLORIDE 10 ML: 10 INJECTION, SOLUTION INFILTRATION; PERINEURAL at 12:05

## 2025-05-01 RX ADMIN — FENTANYL CITRATE 25 MCG: 50 INJECTION, SOLUTION INTRAMUSCULAR; INTRAVENOUS at 12:05

## 2025-05-01 RX ADMIN — MIDAZOLAM HYDROCHLORIDE 0.5 MG: 2 INJECTION, SOLUTION INTRAMUSCULAR; INTRAVENOUS at 12:05

## 2025-05-01 RX ADMIN — MIDAZOLAM HYDROCHLORIDE 1 MG: 2 INJECTION, SOLUTION INTRAMUSCULAR; INTRAVENOUS at 12:05

## 2025-05-01 RX ADMIN — FENTANYL CITRATE 50 MCG: 50 INJECTION, SOLUTION INTRAMUSCULAR; INTRAVENOUS at 12:05

## 2025-05-01 NOTE — PLAN OF CARE
Pt arrived to IR for transplant kidney biopsy. Pt oriented to unit and staff. Plan of care reviewed with patient, patient verbalizes understanding. Comfort measures utilized. Fall risk reviewed with patient, fall risk interventions maintained. Safety strap applied, positioner pillows utilized to minimize pressure points. Blankets applied. Pt prepped and draped utilizing standard sterile technique. Patient placed on continuous monitoring, as required by sedation policy. Timeouts completed utilizing standard universal time-out, per department and facility policy. RN to remain at bedside, continuous monitoring maintained. Pt resting comfortably. Denies pain/discomfort. Will continue to monitor. See flow sheets for monitoring, medication administration, and updates.

## 2025-05-01 NOTE — NURSING
Pt prepped in room 4 on SSCU. Pt is AAOx4 and follows commands appropriately. VS taken and wnl and pt is free from s/s of pain/distress. IV started, mepilex applied to bottom (heals on back order), and preop questions completed. Procedural consent completed and verified. Safety measures in place.

## 2025-05-01 NOTE — PLAN OF CARE
Patient arrived to MPU [address] at 1300. Connected to bedside monitor, cardiac monitoring and continuous pulse oximetry applied. Call bell within reach, side rails raised x 2, bed locked and in lowest position. VSS. Patient in no acute distress. No pain noted. Procedure site clean, dry, and intact; no bleeding or hematoma noted.

## 2025-05-01 NOTE — H&P
Inpatient Radiology Pre-procedure Note    History of Present Illness:  Елена Atkinson is a 70 y.o. 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    Admission H&P reviewed.  Past Medical History:   Diagnosis Date    Brain TIA 05/12/2021    CKD (chronic kidney disease) 08/26/2013    ESRD (end stage renal disease) 12/18/2007    IgA Nephropathy    Gout     History of HCV, s/p successful Rx w/ SVR24 - 5/2016     Liver biopsy 2/6/12: Stage 1 fibrosis Completed 24 weeks Harvoni + RBV w/ SVR    Hyperlipidemia     Hypertension, renal     IgA nephropathy     Immunosuppressive management encounter following kidney transplant     Kidney transplant status, living related donor 08/19/2009    Obesity     Thyroid disease     Type 2 diabetes mellitus      Past Surgical History:   Procedure Laterality Date    CARDIAC SURGERY      CHOLECYSTECTOMY  2005    With liver transplant    FRACTURE SURGERY  2010    Arm broke. Have a zarina    KIDNEY TRANSPLANT      LIVER TRANSPLANT      TUBAL LIGATION         Review of Systems:   As documented in primary team H&P    Home Meds:   Prior to Admission medications    Medication Sig Start Date End Date Taking? Authorizing Provider   allopurinoL (ZYLOPRIM) 300 MG tablet TAKE 1 TABLET BY MOUTH IN THE  MORNING 5/25/23   Gianni Alonso MD   atorvastatin (LIPITOR) 20 MG tablet TAKE 1 TABLET BY MOUTH IN THE  EVENING 5/11/23   Gianni Alonso MD   cyanocobalamin (VITAMIN B-12) 100 MCG tablet Take 100 mcg by mouth after lunch.     Provider, Historical   cyclobenzaprine (FLEXERIL) 10 MG tablet Take 10 mg by mouth daily as needed. 8/21/22   Provider, Historical   EScitalopram oxalate (LEXAPRO) 10 MG tablet TAKE 1 TABLET BY MOUTH ONCE  DAILY  Patient taking differently: Take 20 mg by mouth once daily. 7/7/23   Bc Rene MD   hydrOXYzine pamoate (VISTARIL) 25 MG Cap  Take 25 mg by mouth daily as needed.  Patient not taking: Reported on 9/27/2024 6/26/22   Provider, Historical   levothyroxine (SYNTHROID) 75 MCG tablet TAKE 1 TABLET BY MOUTH BEFORE  BREAKFAST 6/20/23   Gianni Alonso MD   LORazepam (ATIVAN) 0.5 MG tablet Take 0.5 mg by mouth 2 (two) times daily as needed. 3/25/22   Provider, Historical   losartan (COZAAR) 100 MG tablet Take 100 mg by mouth. 7/28/23   Provider, Historical   magnesium glycinate 100 mg Tab Take 3 tablets by mouth once daily. 500 mg daily 12/28/21   Provider, Historical   multivitamin (THERAGRAN) per tablet Take 1 tablet by mouth after lunch.  4/17/12   Provider, Historical   mycophenolate (CELLCEPT) 250 mg Cap TAKE 3 CAPSULES TWICE DAILY 9/18/24   Jeremiah Contreras MD   predniSONE (DELTASONE) 1 MG tablet Take 1 tablet (1 mg total) by mouth 3 (three) times daily. 12/20/22   Gianni Alonso MD   semaglutide (OZEMPIC) 1 mg/dose (2 mg/1.5 mL) PnIj Inject 1 mg into the skin every 7 days.    Provider, Historical   tacrolimus (PROGRAF) 0.5 MG Cap Take 1 capsule (0.5 mg total) by mouth every 12 (twelve) hours.  Patient taking differently: Take 0.5 mg by mouth Daily. 8/8/24   Jeremiah Contreras MD   temazepam (RESTORIL) 15 mg Cap TAKE 1 CAPSULE BY MOUTH DAILY AS NEEDED FOR SLEEP 8/24/23   Gianni Alonso MD   TRESIBA FLEXTOUCH U-100 100 unit/mL (3 mL) insulin pen Inject into the skin. 7/28/23   Provider, Historical   TRESIBA FLEXTOUCH U-100 100 unit/mL (3 mL) insulin pen INJECT SUBCUTANEOUSLY 50 UNITS  ONCE DAILY 12/18/23   Gianni Alonso MD   VITAMIN D2 1,250 mcg (50,000 unit) capsule Take by mouth. 7/28/23   Provider, Historical     Scheduled Meds:   Continuous Infusions:   PRN Meds:  Anticoagulants/Antiplatelets: no anticoagulation    Allergies:   Review of patient's allergies indicates:   Allergen Reactions    Lisinopril      Other reaction(s): Cough    Morphine      Other reaction(s): Itching, Other (See Comments)  Stomach cramps    "    Sedation Hx: have not been any systemic reactions    Labs:  No results for input(s): "INR", "PT", "PTT" in the last 168 hours.  No results for input(s): "WBC", "HGB", "HCT", "MCV", "PLT" in the last 168 hours. No results for input(s): "GLU", "NA", "K", "CL", "CO2", "BUN", "CREATININE", "CALCIUM", "MG", "ALT", "AST", "ALBUMIN", "BILITOT", "BILIDIR" in the last 168 hours.    Vitals:        Physical Exam:  ASA: II  Mallampati: II    General: no acute distress  Mental Status: alert and oriented   HEENT: normocephalic, atraumatic  Chest: unlabored breathing  Abdomen: nondistended  Extremities: moves all extremities    Plan: Proceed with renal transplant biopsy.  Sedation plan: up to moderate.        Candie Marc MD  Diagnostic Radiology       "

## 2025-05-01 NOTE — DISCHARGE INSTRUCTIONS
KIDNEY BIOPSY DISCHARGE EDUCATION:    Information:   A kidney (renal) biopsy is a procedure in which a biopsy needle is used to remove small amounts of kidney tissue to evaluate for kidney function and/or if there is a kidney mass to evaluate if it is cancerous versus benign (non-cancerous).     What should I expect after the Kidney Biopsy?    You may have some blood in your urine after the procedure, this should resolve in a few days.    There may be some soreness around the biopsy site.    You may return to work after 24 hours unless your primary doctor instructs you otherwise.    You do not have any diet restrictions because of this procedure but should continue any that were given to you by any other doctors.    Continue all previously prescribed medications with the exception of Coumadin/warfarin which should be resumed after 24 hours. Pradaxa, Xarelto, Eliquis or Savaysa can be resumed after 48 hours.     Bathing & Wound Care:    You may shower after 24 hours; do not tub bath or submerge in water for 3 days (bath tub, hot tub, swimming pool, river or any other body of water).    Dressing to stay on 2-3 days (clean and dry)    If the biopsy site(s) become red, tender, swollen, or starts to drain, contact us.    Avoid heavy lifting and strenuous activity for 3 days.     Follow-up visit information:   Your ordering physician will typically get your biopsy results in three to five business days. If you do not hear from the ordering physician in 7 business days, please call his/her office to set up an appointment to review the results. Follow up with Interventional Radiology is not usually necessary.       Occasionally, a situation will require prompt attention and an emergency room visit is necessary:    Sudden chest pain, shortness of breath, or fainting    Increasing blood in your urine    Increasing redness, swelling or drainage from the biopsy site    Increasing pain not relieved by medication    Bleeding or  drainage from the needle site that is saturating the dressing    You have shaking, chills and/or a temperature over 100.3°F    New, sudden difficulty breathing    Drop in blood pressure, and/or light-headed feeling    Interventional Radiology Clinic   For complications   [phone]. Monday - Friday, 8:00 am - 4:00 pm    [phone] After hours and on holidays. Ask to speak with the interventional radiologist on call.     For Scheduling   [phone] Monday - Friday, 8:00 am - 4:00 pm

## 2025-05-01 NOTE — Clinical Note
Right: Abdomen.   Scrubbed with ChloroPrep With Tint.    Hair: N/A.  Skin prep dry before draping.  Prepped by: Tyson Ruiz MD.

## 2025-05-01 NOTE — PLAN OF CARE
Transplant kidney biopsy completed, pt tolerated well. No apparent distress noted. Dressing applied CDI. Specimen collected and sent. Pt to be transported to MPU for 2 hour recovery per MD by this RN. Report to be given at bedside to recovery RN.

## 2025-05-01 NOTE — PLAN OF CARE
Post-procedure recovery completed. Patient AAOx3, no distress noted, respirations even and unlabored, VSS. Biopsy incision site clean, dry, and intact; no bleeding or hematoma noted. Peripheral IV removed. Personal belongings returned to patient. Discharge instructions given to patient; patient accepting of education provided. Patient to be wheeled to location via patient transporter to meet family for ride home. Patient stable for transport.

## 2025-05-06 ENCOUNTER — RESULTS FOLLOW-UP (OUTPATIENT)
Dept: TRANSPLANT | Facility: HOSPITAL | Age: 70
End: 2025-05-06

## 2025-06-18 ENCOUNTER — OFFICE VISIT (OUTPATIENT)
Dept: TRANSPLANT | Facility: CLINIC | Age: 70
End: 2025-06-18
Payer: MEDICARE

## 2025-06-18 VITALS
SYSTOLIC BLOOD PRESSURE: 137 MMHG | TEMPERATURE: 97 F | HEART RATE: 87 BPM | RESPIRATION RATE: 18 BRPM | HEIGHT: 63 IN | OXYGEN SATURATION: 97 % | BODY MASS INDEX: 32.19 KG/M2 | DIASTOLIC BLOOD PRESSURE: 63 MMHG | WEIGHT: 181.69 LBS

## 2025-06-18 DIAGNOSIS — Z79.899 IMMUNODEFICIENCY DUE TO LONG TERM DRUG THERAPY: ICD-10-CM

## 2025-06-18 DIAGNOSIS — E11.9 TYPE 2 DIABETES MELLITUS WITHOUT COMPLICATION, WITH LONG-TERM CURRENT USE OF INSULIN: ICD-10-CM

## 2025-06-18 DIAGNOSIS — Z29.89 PROPHYLACTIC IMMUNOTHERAPY: ICD-10-CM

## 2025-06-18 DIAGNOSIS — Z94.0 KIDNEY TRANSPLANT STATUS, LIVING RELATED DONOR: Primary | Chronic | ICD-10-CM

## 2025-06-18 DIAGNOSIS — D84.821 IMMUNODEFICIENCY DUE TO LONG TERM DRUG THERAPY: ICD-10-CM

## 2025-06-18 DIAGNOSIS — Z79.4 TYPE 2 DIABETES MELLITUS WITHOUT COMPLICATION, WITH LONG-TERM CURRENT USE OF INSULIN: ICD-10-CM

## 2025-06-18 DIAGNOSIS — R80.1 PERSISTENT PROTEINURIA: ICD-10-CM

## 2025-06-18 DIAGNOSIS — Z94.4 LIVER REPLACED BY TRANSPLANT: ICD-10-CM

## 2025-06-18 PROCEDURE — 99999 PR PBB SHADOW E&M-EST. PATIENT-LVL IV: CPT | Mod: PBBFAC,,, | Performed by: STUDENT IN AN ORGANIZED HEALTH CARE EDUCATION/TRAINING PROGRAM

## 2025-06-18 PROCEDURE — 99214 OFFICE O/P EST MOD 30 MIN: CPT | Mod: PBBFAC | Performed by: STUDENT IN AN ORGANIZED HEALTH CARE EDUCATION/TRAINING PROGRAM

## 2025-06-18 RX ORDER — AMLODIPINE BESYLATE 2.5 MG/1
2.5 TABLET ORAL 2 TIMES DAILY
COMMUNITY
Start: 2025-05-05

## 2025-06-18 NOTE — LETTER
June 19, 2025        Ronnie Perlata  [address] [address]  Phone: [phone]  Fax: [phone]             [facility]- [address]  [address]  [place] [address]  Phone: [phone]   Patient: Елена Atkinson   MR Number: [id number]   YOB: 1955   Date of Visit: 6/18/2025       Dear Dr. Ronnie Peralta    Thank you for referring Елена Atkinson to me for evaluation. Attached you will find relevant portions of my assessment and plan of care.    If you have questions, please do not hesitate to call me. I look forward to following Елена Atkinson along with you.    Sincerely,    Donavan Cruz Jr., MD    Enclosure    If you would like to receive this communication electronically, please contact [email or web address] or [phone] to request Sleep Number Link access.    Sleep Number Link is a tool which provides read-only access to select patient information with whom you have a relationship. Its easy to use and provides real time access to review your patients record including encounter summaries, notes, results, and demographic information.    If you feel you have received this communication in error or would no longer like to receive these types of communications, please e-mail [email or web address]

## 2025-06-19 NOTE — PROGRESS NOTES
Post-Transplant Assessment    Referring Physician: Grayson Hawthorne  Current Nephrologist: Ronnie Peralta    ORGAN: LEFT KIDNEY  Donor Type: living  PHS Increased Risk: no  Cold Ischemia: 60 mins  Induction Medications: steroids (prednisone,methylprednisolone,solumedrol,medrol,decadron)    No chief complaint on file.    Presents to the clinic today for medical conditions listed below.  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 Patient and I agreed to perform biopsy to assess for reversible causes of the proteinuria. Serological workup thus far is negative. Biopsy with CAN and TG and CNI tox       Today:  History of Present Illness    Елена presents today for follow up regarding kidney biopsy results She has a history of IgA nephropathy and glomerulonephritis leading to dialysis for two years before receiving a kidney transplant in 2009. She has maintained proteinuria since 2012, currently at 4 g (consistently >2 g daily). Her creatinine levels have remained stable between 0.6-0.8 over the past decade. The current transplanted kidney does not show active IgA deposits. She has a history of liver  transplant. She reports recent low morning blood sugars. She takes long-acting insulin 38 units in the morning if glucose is over 100, and utilizes a sliding scale starting at 150. She prefers to assess meal intake before determining insulin dose. She takes Losartan, a small dose of Tacrolimus, and has been on Ozempic for over one year. She reports no medication side effects.      ROS:  Musculoskeletal: +muscle cramps         Physical Exam :  Vitals:    06/18/25 1413   BP: 137/63   Pulse: 87   Resp: 18   Temp: 97.3 °F (36.3 °C)     Physical Exam    General: Well-appearing.  Lungs: Normal respiratory effort. No respiratory distress.  Extremities: No edema lower extremities.         Last 3 sets of metabolic panel, blood count, drug levels reviewed; Most recent discharge reviewed; Most recent imaging of the kidneys reviewed and noted when available  1. Kidney transplant status, living related donor 8/19/2009    2. Prophylactic immunotherapy    3. 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 Type 2 diabetes mellitus without complication, with long-term current use of insulin    5. Liver replaced by transplant for HCV 7/2003    6. Persistent proteinuria      These problems pose a threat to patient's life or bodily function    Assessment & Plan    Z94.0 Kidney transplant status  Z94.4 Liver transplant status  T86.12 Kidney transplant failure  N02.8 Recurrent and persistent hematuria with other morphologic changes  N03.9 Chronic nephritic syndrome with unspecified morphologic changes  R80.1 Persistent proteinuria, unspecified  E11.65 Type 2 diabetes mellitus with hyperglycemia  Z79.4 Long term (current) use of insulin    PLAN SUMMARY:  - Continue current GLP-1 agonist for kidney protection  - Continue Losartan (ARB) for proteinuria reduction and slowing renal disease progression  - Consider gradual addition of kidney-protective medications  - Conservative management approach, avoiding immunosuppressive changes  -  Follow up with Dr. Peralta for future kidney care  - Be prepared for potential handoff to another nephrologist if Dr. Peralta leaves the practice    KIDNEY TRANSPLANT STATUS AND COMPLICATIONS:  - Reviewed renal function and proteinuria history since 2012, noting stable creatinine levels and persistent proteinuria.  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  - Assessed low risk of dialysis need given stable renal function over past decade.  - Considered conservative management approach, avoiding immunosuppressive changes.  - Discussed potential for gradual addition of kidney-protective medications to slow progression, though current decline is minimal.  - Explained glomerulus structure and function in relation to proteinuria and transplant glomerulopathy as cause of protein in urine, differentiating from previous IgA nephropathy.  - Reviewed renal function decline with aging and potential interventions to slow progression.    PERSISTENT PROTEINURIA:  - Continue Losartan (ARB) for proteinuria reduction and slowing renal disease progression.    DIABETES MANAGEMENT:  - Continue current GLP-1 agonist for kidney protection.    FOLLOW-UP CARE:  - Follow up with Dr. Peralta for future kidney care.  - Be prepared for potential handoff to another nephrologist if Dr. Peralta leaves the practice.         Immediate changes:    Kidney biopsy with TG  No medication changes today  Discussed long term possible meds  Discussed with Dr. Peralta    1. CKD stage:   Will continue follow up as per our center guidelines. patient to continue close follow up with the local General nephrologist. Education provided in appropriate fluid intake, potassium intake. Continue with oral hydration.    2. Immunosuppression:   Will closely monitor for toxicities, education provided about adherence to medicines and need to communicate any side effect to the  "transplant nurse or physician.  Lab Results   Component Value Date    TACROLIMUS 3.0 (L) 04/14/2025    TACROLIMUS 7.0 04/04/2025    TACROLIMUS 9.1 03/25/2025     No results found for: "CYCLOSPORINE"    3. Allograft Function:  Stable at baseline for the patient. Continue follow up as per our guidelines and with the local General nephrologist. Communication will be sent today.  Lab Results   Component Value Date    CREATININE 0.6 05/01/2025    CREATININE 0.7 04/14/2025    CREATININE 0.7 03/25/2025     No results found for: "AMYLASE", "LIPASE"    4. Hypertension management:    Continue with home blood pressure monitoring, low salt and healthy life discussed with the patient    5. Metabolic Bone Disease/Secondary Hyperparathyroidism:   Calcium and phosphorus level discussed with the patient, patient will continue follow up with the general nephrologist for management of metabolic bone disease. Calcium and phosphorus as per our center protocol.  Lab Results   Component Value Date    PTH 86 (H) 04/04/2025    CALCIUM 8.5 (L) 05/01/2025    CAION 1.17 09/05/2012    PHOS 2.6 (L) 03/25/2025    PHOS 4.0 09/05/2012    PHOS 3.5 02/06/2012       6. Electrolytes:   Reviewed with the patient, essentially within the normal range no need for acute changes today, will monitor as per our center guidelines.  Lab Results   Component Value Date     05/01/2025    K 4.3 05/01/2025     05/01/2025    CO2 26 05/01/2025    CO2 24 04/14/2025    CO2 23 03/25/2025       7. Anemia:   Will continue monitoring as per our center guidelines. No indication for acute intervention today.  Lab Results   Component Value Date    WBC 8.79 04/14/2025    HGB 11.0 (L) 04/14/2025    HCT 34.6 (L) 04/14/2025    MCV 98 04/14/2025     04/14/2025       8.Proteinuria:   Will continue with pr/cr ratio as per our center guidelines  Lab Results   Component Value Date    PROTEINURINE 273 04/04/2025    CREATRANDUR 60.6 04/04/2025    UTPCR 7.80 (H) " "08/26/2013    UTPCR 1.94 (H) 02/07/2012    UTPCR 3.00 (H) 02/06/2012        9. BK virus infection screening:   Will continue with urine or blood PCR as per our guidelines to prevent BK virus viremia and allograft dysfunction  No results found for: "BKVIRUSDNAUR", "BKQUANTURINE", "BKVIRUSLOG", "BKVIRUSURINE"      10. Weight education:   Provided during the clinic visit.  Body mass index is 32.18 kg/m².     11.Patient safety education regarding immunosuppression including prophylaxis posttransplant for CMV, PCP :   Education provided about vaccination and prevention of infections.    12.  Cytopenias:   No significant cytopenias will monitor as per our guidelines. Medicine list reviewed including potential causes of drug-induced cytopenias  Lab Results   Component Value Date    WBC 8.79 04/14/2025    HGB 11.0 (L) 04/14/2025    HCT 34.6 (L) 04/14/2025    MCV 98 04/14/2025     04/14/2025       13. Post-transplant Prophylaxis: CMV Infection, PJP and Candida mucosistis and other indicated for this particular patient.     I spent a total of 21 minutes on the day of the visit.      No orders of the defined types were placed in this encounter.    There are no discontinued medications.   Future Appointments   Date Time Provider Department Center   6/30/2025 11:00 AM Ronnie Peralta MD [facility]       Follow-up:   Clinic: return to transplant clinic weekly for the first month after transplant; every 2 weeks during months 2-3; then at 6-, 9-, 12-, 18-, 24-, and 36- months post-transplant to reassess for complications from immunosuppression toxicity and monitor for rejection.  Annually thereafter.    Labs: since patient remains at high risk for rejection and drug-related complications that warrant close monitoring, labs will be ordered as follows: continue twice weekly CBC, renal panel, and drug level for first month; then same labs once weekly through 3rd month post-transplant.  Urine for UA and " protein/creatinine ratio monthly.  Serum BK - PCR at 1-, 3-, 6-, 9-, 12-, 18-, 24-, 36- 48-, and 60 months post-transplant.  Hepatic panel at 1-, 2-, 3-, 6-, 9-, 12-, 18-, 24-, and 36- months post-transplant.    Donavan Cruz Jr., MD       Education:   Material provided to the patient.  Patient reminded to call with any health changes since these can be early signs of significant complications.  Also, I advised the patient to be sure any new medications or changes of old medications are discussed with either a pharmacist or physician knowledgeable with transplant to avoid rejection/drug toxicity related to significant drug interactions.    Patient advised that it is recommended that all transplanted patients, and their close contacts and household members receive Covid vaccination.    Visit today included increased complexity associated with the care of the episodic problem kidney transplant addressed and managing the longitudinal care of the patient due to the serious and/or complex managed problem(s) kidney transplant, CKD, HTN, secondary hyperparathyroidism, and anemia of chronic disease.    UNOS Patient Status  Functional Status: 70% - Cares for self: unable to carry on normal activity or active work  Physical Capacity: Limited Mobility

## 2025-07-16 ENCOUNTER — PATIENT MESSAGE (OUTPATIENT)
Dept: TRANSPLANT | Facility: CLINIC | Age: 70
End: 2025-07-16
Payer: MEDICARE

## 2025-08-28 DIAGNOSIS — Z94.4 LIVER REPLACED BY TRANSPLANT: ICD-10-CM

## 2025-08-29 RX ORDER — TACROLIMUS 0.5 MG/1
0.5 CAPSULE ORAL EVERY 12 HOURS
Qty: 60 CAPSULE | Refills: 11 | Status: SHIPPED | OUTPATIENT
Start: 2025-08-29

## (undated) DEVICE — SUT 4-0 12-18IN SILK BLACK

## (undated) DEVICE — ELECTRODE REM PLYHSV RETURN 9

## (undated) DEVICE — COVER LIGHT HANDLE 80/CA

## (undated) DEVICE — DRESSING TRANS 2X2 TEGADERM

## (undated) DEVICE — CONTAINER SPECIMEN STRL 4OZ

## (undated) DEVICE — DRESSING TRANS 4X4 TEGADERM

## (undated) DEVICE — DRAPE PLASTIC U 60X72

## (undated) DEVICE — DRESSING TELFA STRL 4X3 LF

## (undated) DEVICE — SUT SILK 2-0 SH 18IN BLACK

## (undated) DEVICE — SUT MCRYL PLUS 4-0 PS2 27IN

## (undated) DEVICE — SPONGE DERMACEA GAUZE 4X4

## (undated) DEVICE — HEMOSTAT SURGICEL 4X8IN

## (undated) DEVICE — CANNULA VESSEL

## (undated) DEVICE — APPLICATOR CHLORAPREP ORN 26ML

## (undated) DEVICE — DRESSING TELFA N ADH 3X8

## (undated) DEVICE — SUT 3-0 12-18IN SILK

## (undated) DEVICE — SET DECANTER MEDICHOICE

## (undated) DEVICE — SUT MONOCRYL 3-0 SH U/D

## (undated) DEVICE — TRAY MINOR GEN SURG

## (undated) DEVICE — SEE MEDLINE ITEM 157173